# Patient Record
Sex: MALE | Race: WHITE | Employment: OTHER | URBAN - METROPOLITAN AREA
[De-identification: names, ages, dates, MRNs, and addresses within clinical notes are randomized per-mention and may not be internally consistent; named-entity substitution may affect disease eponyms.]

---

## 2017-01-17 ENCOUNTER — TELEPHONE (OUTPATIENT)
Dept: INTERNAL MEDICINE CLINIC | Facility: CLINIC | Age: 57
End: 2017-01-17

## 2017-01-18 RX ORDER — HYDROCODONE BITARTRATE AND ACETAMINOPHEN 10; 325 MG/1; MG/1
2 TABLET ORAL 3 TIMES DAILY PRN
Qty: 180 TABLET | Refills: 0 | Status: SHIPPED | OUTPATIENT
Start: 2017-01-18 | End: 2017-02-17

## 2017-01-18 RX ORDER — OXYCODONE HCL 40 MG/1
40 TABLET, FILM COATED, EXTENDED RELEASE ORAL 2 TIMES DAILY
Qty: 60 TABLET | Refills: 0 | Status: SHIPPED | OUTPATIENT
Start: 2017-01-18 | End: 2017-02-17

## 2017-01-18 NOTE — TELEPHONE ENCOUNTER
Imp- osteoarthritis; refilled oxycontin ER 40 mg po BID, #60 and Norco 10 mg two tabs po TID, #180; Rx mailed to pt; also, he is due for F/U in Feb or March; please call pt

## 2017-01-18 NOTE — TELEPHONE ENCOUNTER
Patient was contacted and notified that Rx's mailed to him per Jody Jeffery and that he is due for F/U in Feb or March. Patient verbalized understanding and will call back to schedule appointment.

## 2017-02-17 ENCOUNTER — TELEPHONE (OUTPATIENT)
Dept: INTERNAL MEDICINE CLINIC | Facility: CLINIC | Age: 57
End: 2017-02-17

## 2017-02-17 RX ORDER — HYDROCODONE BITARTRATE AND ACETAMINOPHEN 10; 325 MG/1; MG/1
2 TABLET ORAL 3 TIMES DAILY PRN
Qty: 180 TABLET | Refills: 0 | Status: SHIPPED | OUTPATIENT
Start: 2017-02-17 | End: 2017-03-20

## 2017-02-17 RX ORDER — OXYCODONE HCL 40 MG/1
40 TABLET, FILM COATED, EXTENDED RELEASE ORAL 2 TIMES DAILY
Qty: 60 TABLET | Refills: 0 | Status: SHIPPED | OUTPATIENT
Start: 2017-02-17 | End: 2017-03-20

## 2017-02-17 NOTE — TELEPHONE ENCOUNTER
[phone]  Pt requesting Rx refill of OxyContin and Angora to be mailed to his [address] address  To Rx

## 2017-02-17 NOTE — TELEPHONE ENCOUNTER
Patient called back. Relayed Dr Reggie Padilla message to him. Patient verbalized understanding. He will call back at later time to schedule follow up appointment.

## 2017-02-28 ENCOUNTER — PATIENT OUTREACH (OUTPATIENT)
Dept: INTERNAL MEDICINE CLINIC | Facility: CLINIC | Age: 57
End: 2017-02-28

## 2017-02-28 NOTE — PROGRESS NOTES
Fax received from Jacky Ag in [address] [place] [address], that patient was admitted on 2/24/17 and discharged on 2/27/17. All fax indicates is that patient was discharged home and will follow-up with PCP.  I contacted patient who states that he was i

## 2017-02-28 NOTE — PROGRESS NOTES
Initial Post Discharge Follow Up   Discharge Date:  2/27/2017  Contact Date: 2/28/2017    Consent Verification:  Assessment Completed With: Patient  HIPAA Verified? Yes    1.  Tell me why you were in the hospital?  Patient states that he was hospitalized b BID ) Disp: 30 g Rfl: 3   Clopidogrel Bisulfate (PLAVIX) 75 MG Oral Tab Take 1 tablet (75 mg total) by mouth daily.  Disp: 90 tablet Rfl: 3   Budesonide-Formoterol Fumarate (SYMBICORT) 160-4.5 MCG/ACT Inhalation Aerosol Inhale 2 puffs into the lungs 2 (two) orders reviewed.

## 2017-03-03 ENCOUNTER — LAB ENCOUNTER (OUTPATIENT)
Dept: LAB | Age: 57
End: 2017-03-03
Attending: INTERNAL MEDICINE
Payer: MEDICARE

## 2017-03-03 ENCOUNTER — OFFICE VISIT (OUTPATIENT)
Dept: INTERNAL MEDICINE CLINIC | Facility: CLINIC | Age: 57
End: 2017-03-03

## 2017-03-03 VITALS
RESPIRATION RATE: 16 BRPM | SYSTOLIC BLOOD PRESSURE: 130 MMHG | BODY MASS INDEX: 32.64 KG/M2 | TEMPERATURE: 98 F | HEIGHT: 69 IN | HEART RATE: 104 BPM | WEIGHT: 220.38 LBS | DIASTOLIC BLOOD PRESSURE: 72 MMHG

## 2017-03-03 DIAGNOSIS — I73.9 PVD (PERIPHERAL VASCULAR DISEASE) (HCC): ICD-10-CM

## 2017-03-03 DIAGNOSIS — I71.4 ABDOMINAL AORTIC ANEURYSM (AAA) WITHOUT RUPTURE (HCC): ICD-10-CM

## 2017-03-03 DIAGNOSIS — I10 ESSENTIAL HYPERTENSION: ICD-10-CM

## 2017-03-03 DIAGNOSIS — J44.1 COPD EXACERBATION (HCC): Primary | ICD-10-CM

## 2017-03-03 DIAGNOSIS — M48.061 LUMBAR SPINAL STENOSIS: ICD-10-CM

## 2017-03-03 DIAGNOSIS — E78.00 PURE HYPERCHOLESTEROLEMIA: ICD-10-CM

## 2017-03-03 DIAGNOSIS — M19.90 OSTEOARTHRITIS, UNSPECIFIED OSTEOARTHRITIS TYPE, UNSPECIFIED SITE: ICD-10-CM

## 2017-03-03 LAB
ANION GAP SERPL CALC-SCNC: 7 MMOL/L (ref 0–18)
BASOPHILS # BLD: 0.1 K/UL (ref 0–0.2)
BASOPHILS NFR BLD: 1 %
BUN SERPL-MCNC: 20 MG/DL (ref 8–20)
BUN/CREAT SERPL: 18.5 (ref 10–20)
CALCIUM SERPL-MCNC: 8.7 MG/DL (ref 8.5–10.5)
CHLORIDE SERPL-SCNC: 105 MMOL/L (ref 95–110)
CO2 SERPL-SCNC: 28 MMOL/L (ref 22–32)
CREAT SERPL-MCNC: 1.08 MG/DL (ref 0.5–1.5)
EOSINOPHIL # BLD: 0.5 K/UL (ref 0–0.7)
EOSINOPHIL NFR BLD: 4 %
ERYTHROCYTE [DISTWIDTH] IN BLOOD BY AUTOMATED COUNT: 14.3 % (ref 11–15)
GLUCOSE SERPL-MCNC: 106 MG/DL (ref 70–99)
HCT VFR BLD AUTO: 43.7 % (ref 41–52)
HGB BLD-MCNC: 14.6 G/DL (ref 13.5–17.5)
LYMPHOCYTES # BLD: 3.3 K/UL (ref 1–4)
LYMPHOCYTES NFR BLD: 24 %
MCH RBC QN AUTO: 29.9 PG (ref 27–32)
MCHC RBC AUTO-ENTMCNC: 33.5 G/DL (ref 32–37)
MCV RBC AUTO: 89.2 FL (ref 80–100)
MONOCYTES # BLD: 1.2 K/UL (ref 0–1)
MONOCYTES NFR BLD: 9 %
NEUTROPHILS # BLD AUTO: 9.1 K/UL (ref 1.8–7.7)
NEUTROPHILS NFR BLD: 64 %
OSMOLALITY UR CALC.SUM OF ELEC: 293 MOSM/KG (ref 275–295)
PLATELET # BLD AUTO: 223 K/UL (ref 140–400)
PMV BLD AUTO: 10.6 FL (ref 7.4–10.3)
POTASSIUM SERPL-SCNC: 3.7 MMOL/L (ref 3.3–5.1)
RBC # BLD AUTO: 4.9 M/UL (ref 4.5–5.9)
SODIUM SERPL-SCNC: 140 MMOL/L (ref 136–144)
WBC # BLD AUTO: 14.2 K/UL (ref 4–11)

## 2017-03-03 PROCEDURE — 36415 COLL VENOUS BLD VENIPUNCTURE: CPT

## 2017-03-03 PROCEDURE — 99496 TRANSJ CARE MGMT HIGH F2F 7D: CPT | Performed by: INTERNAL MEDICINE

## 2017-03-03 PROCEDURE — 85025 COMPLETE CBC W/AUTO DIFF WBC: CPT

## 2017-03-03 PROCEDURE — 80048 BASIC METABOLIC PNL TOTAL CA: CPT

## 2017-03-03 RX ORDER — PAROXETINE 10 MG/1
10 TABLET, FILM COATED ORAL EVERY MORNING
Qty: 30 TABLET | Refills: 5 | Status: SHIPPED | OUTPATIENT
Start: 2017-03-03 | End: 2019-06-06

## 2017-03-03 RX ORDER — TORSEMIDE 10 MG/1
10 TABLET ORAL DAILY
Qty: 30 TABLET | Refills: 5 | Status: SHIPPED | OUTPATIENT
Start: 2017-03-03 | End: 2021-09-08

## 2017-03-03 NOTE — PROGRESS NOTES
HPI:    Braxton Estrada is a 60year old male here today for hospital follow up.    He was discharged from Inpatient hospital, [facility] in [address], [place] to Home   Admission Date:  2/23/17  Discharge Date:  2/27/17  NEDA CAMPOS Discharge Soraida He has been advised for MRI lumbar spine to exclude spinal stenosis. He states he needs conscious sedation with propofol for MRI imaging. He has failed sedation with diazepam in the past.    He was treated Paxil 10 mg po qD for generalized anxiety.     MRI complete; angioplasty (coronary) (); appendectomy; other surgical history (2014); and other surgical history (10/2014).     He family history includes Breast Cancer (age of onset: 48) in his sister; Cancer in his brother; Heart Disease in an other fa tenderness  MUSCULOSKELETAL: back is not tender, FROM of the extremities  EXTREMITIES: no cyanosis, clubbing or edema  NEURO: Oriented times three, cranial nerves are intact, motor and sensory are grossly intact    ASSESSMENT/ PLAN:   Diagnoses and all ord psoriatic arthritis is raised; XR of hands done elsewhere (report not available); no longer sees rheumatologist in [facility], [address] at [facility] as he did not want to take immunosuppressant medications  AAA   Pt s/p AAA endograft in Oct 2014 at [facility]; [E]  Basic Metabolic Panel (8) [E]    Meds & Refills for this Visit:  Signed Prescriptions Disp Refills    torsemide 10 MG Oral Tab 30 tablet 5      Sig: Take 1 tablet (10 mg total) by mouth daily.       PARoxetine HCl 10 MG Oral Tab 30 tablet 5      Sig: T

## 2017-03-23 RX ORDER — OXYCODONE HCL 40 MG/1
40 TABLET, FILM COATED, EXTENDED RELEASE ORAL 2 TIMES DAILY
Qty: 60 TABLET | Refills: 0 | Status: SHIPPED | OUTPATIENT
Start: 2017-03-23 | End: 2017-04-24

## 2017-03-23 RX ORDER — HYDROCODONE BITARTRATE AND ACETAMINOPHEN 10; 325 MG/1; MG/1
2 TABLET ORAL 3 TIMES DAILY PRN
Qty: 180 TABLET | Refills: 0 | Status: SHIPPED | OUTPATIENT
Start: 2017-03-23 | End: 2017-04-24

## 2017-03-23 NOTE — TELEPHONE ENCOUNTER
Imp- osteoarthritis; refilled oxycontin ER 40 mg po BID, #60 and Norco 10 mg two tabs po TID, #180; Rx mailed to pt

## 2017-04-19 NOTE — TELEPHONE ENCOUNTER
Pt calling     Asking for refills for oxycontin ER 40 mg po BID, #60 and Norco 10 mg two tabs po TID    Pt would like these mailed to his home

## 2017-04-24 RX ORDER — HYDROCODONE BITARTRATE AND ACETAMINOPHEN 10; 325 MG/1; MG/1
2 TABLET ORAL 3 TIMES DAILY PRN
Qty: 180 TABLET | Refills: 0 | Status: SHIPPED | OUTPATIENT
Start: 2017-04-24 | End: 2017-05-15

## 2017-04-24 RX ORDER — OXYCODONE HCL 40 MG/1
40 TABLET, FILM COATED, EXTENDED RELEASE ORAL 2 TIMES DAILY
Qty: 60 TABLET | Refills: 0 | Status: SHIPPED | OUTPATIENT
Start: 2017-04-24 | End: 2017-05-15

## 2017-04-24 NOTE — TELEPHONE ENCOUNTER
To MD:  The above refill request is for a controlled substance. Please indicate yes or no to refill 30 days supply plus one refill. If more refills are appropriate, please indicate quantity  Pending - to DR. MEDINA

## 2017-04-25 NOTE — TELEPHONE ENCOUNTER
Imp- osteoarthritis; refilled oxycontin ER 40 mg po BID, #60 and Norco 10 mg two tabs po TID, #180; Rx mailed to pt; HANNAH

## 2017-05-15 RX ORDER — HYDROCODONE BITARTRATE AND ACETAMINOPHEN 10; 325 MG/1; MG/1
2 TABLET ORAL 3 TIMES DAILY PRN
Qty: 180 TABLET | Refills: 0 | Status: CANCELLED | OUTPATIENT
Start: 2017-05-15

## 2017-05-15 RX ORDER — HYDROCODONE BITARTRATE AND ACETAMINOPHEN 10; 325 MG/1; MG/1
2 TABLET ORAL 3 TIMES DAILY PRN
Qty: 180 TABLET | Refills: 0 | Status: SHIPPED | OUTPATIENT
Start: 2017-05-15 | End: 2017-06-19

## 2017-05-15 RX ORDER — OXYCODONE HCL 40 MG/1
40 TABLET, FILM COATED, EXTENDED RELEASE ORAL 2 TIMES DAILY
Qty: 60 TABLET | Refills: 0 | Status: SHIPPED | OUTPATIENT
Start: 2017-05-15 | End: 2017-06-19

## 2017-05-15 NOTE — TELEPHONE ENCOUNTER
Pt. Is requesting a refill on: Norco,   Oxycodone   Pt.  Would like Rx sent to home  (address verified) ph. # [phone]   Routed to rx

## 2017-05-15 NOTE — TELEPHONE ENCOUNTER
To MD:  The above refill request is for a controlled substance. Please indicate yes or no to refill 30 days supply plus one refill. If more refills are appropriate, please indicate quantity  Pending - to DR. MEDINA   Patient wants RX mailed to home

## 2017-06-16 ENCOUNTER — TELEPHONE (OUTPATIENT)
Dept: INTERNAL MEDICINE CLINIC | Facility: CLINIC | Age: 57
End: 2017-06-16

## 2017-06-19 ENCOUNTER — TELEPHONE (OUTPATIENT)
Dept: INTERNAL MEDICINE CLINIC | Facility: CLINIC | Age: 57
End: 2017-06-19

## 2017-06-19 NOTE — TELEPHONE ENCOUNTER
Pt. Is requesting a refill on: [place] & Oxycontin    Pt.  Would like Rx mailed (address verified)  Ph. # [phone]   Routed to Rx   msg 1 of 2

## 2017-06-19 NOTE — TELEPHONE ENCOUNTER
Pt. Is calling to inform Dr. Dinesh Domínguez that he had emergency surgery on both of his legs Friday, they had to put veins in both legs  Ph. # [phone]    Routed to clinical   msg 2 of 2

## 2017-06-21 RX ORDER — HYDROCODONE BITARTRATE AND ACETAMINOPHEN 10; 325 MG/1; MG/1
2 TABLET ORAL 3 TIMES DAILY PRN
Qty: 180 TABLET | Refills: 0 | Status: SHIPPED | OUTPATIENT
Start: 2017-06-21 | End: 2017-07-20

## 2017-06-21 RX ORDER — OXYCODONE HCL 40 MG/1
40 TABLET, FILM COATED, EXTENDED RELEASE ORAL 2 TIMES DAILY
Qty: 60 TABLET | Refills: 0 | Status: SHIPPED | OUTPATIENT
Start: 2017-06-21 | End: 2017-07-20

## 2017-07-20 ENCOUNTER — TELEPHONE (OUTPATIENT)
Dept: INTERNAL MEDICINE CLINIC | Facility: CLINIC | Age: 57
End: 2017-07-20

## 2017-07-20 RX ORDER — OXYCODONE HCL 40 MG/1
40 TABLET, FILM COATED, EXTENDED RELEASE ORAL 2 TIMES DAILY
Qty: 60 TABLET | Refills: 0 | Status: SHIPPED | OUTPATIENT
Start: 2017-07-20 | End: 2017-08-17

## 2017-07-20 RX ORDER — HYDROCODONE BITARTRATE AND ACETAMINOPHEN 10; 325 MG/1; MG/1
2 TABLET ORAL 3 TIMES DAILY PRN
Qty: 180 TABLET | Refills: 0 | Status: SHIPPED | OUTPATIENT
Start: 2017-07-20 | End: 2017-08-17

## 2017-08-17 ENCOUNTER — TELEPHONE (OUTPATIENT)
Dept: INTERNAL MEDICINE CLINIC | Facility: CLINIC | Age: 57
End: 2017-08-17

## 2017-08-17 RX ORDER — OXYCODONE HCL 40 MG/1
40 TABLET, FILM COATED, EXTENDED RELEASE ORAL 2 TIMES DAILY
Qty: 60 TABLET | Refills: 0 | Status: SHIPPED | OUTPATIENT
Start: 2017-08-17 | End: 2017-09-13

## 2017-08-17 RX ORDER — HYDROCODONE BITARTRATE AND ACETAMINOPHEN 10; 325 MG/1; MG/1
2 TABLET ORAL 3 TIMES DAILY PRN
Qty: 180 TABLET | Refills: 0 | Status: SHIPPED | OUTPATIENT
Start: 2017-08-17 | End: 2017-09-13

## 2017-08-17 NOTE — TELEPHONE ENCOUNTER
Pt requesting that refills for his medications be mailed to him  Norco  Oxycontin  Pt will run out of medication on 8/24/17  Pt will schedule appt in October  Tasked to [employer]

## 2017-09-13 ENCOUNTER — TELEPHONE (OUTPATIENT)
Dept: INTERNAL MEDICINE CLINIC | Facility: CLINIC | Age: 57
End: 2017-09-13

## 2017-09-13 NOTE — TELEPHONE ENCOUNTER
To MD:  The above refill request is for a controlled substance. Please indicate yes or no to refill 30 days supply plus one refill. If more refills are appropriate, please indicate quantity  To DR. MEDINA - patient wants RX mailed to his PO Box Patient/Caregiver provided printed discharge information.

## 2017-09-14 RX ORDER — OXYCODONE HCL 40 MG/1
40 TABLET, FILM COATED, EXTENDED RELEASE ORAL 2 TIMES DAILY
Qty: 60 TABLET | Refills: 0 | Status: SHIPPED | OUTPATIENT
Start: 2017-09-14 | End: 2017-10-06

## 2017-09-14 RX ORDER — HYDROCODONE BITARTRATE AND ACETAMINOPHEN 10; 325 MG/1; MG/1
2 TABLET ORAL 3 TIMES DAILY PRN
Qty: 180 TABLET | Refills: 0 | Status: SHIPPED | OUTPATIENT
Start: 2017-09-14 | End: 2017-10-06

## 2017-09-14 NOTE — TELEPHONE ENCOUNTER
Dr. Jessica Choi message relayed to pt who verbalized understanding. Pt states he will call back Monday to make appt.

## 2017-09-14 NOTE — TELEPHONE ENCOUNTER
Imp- osteoarthritis; refilled oxycontin ER 40 mg po BID, #60 and Norco 10 mg two tabs po TID, #180; Rx mailed to pt; pt due for OV in next 1-2 months; please call pt

## 2017-09-22 NOTE — TELEPHONE ENCOUNTER
Pt. Is calling because his insurance company told him that he needs PA done. The insurance company is faxing over the form now, he would like us to complete it today so he can get his Rx filled.   Ph. # [phone]  Routed to Rx

## 2017-09-22 NOTE — TELEPHONE ENCOUNTER
[phone] [facility] faxing [place] for Oxycodone 40 mg.   Placed in scanning   Tasked to rx

## 2017-10-06 ENCOUNTER — OFFICE VISIT (OUTPATIENT)
Dept: INTERNAL MEDICINE CLINIC | Facility: CLINIC | Age: 57
End: 2017-10-06

## 2017-10-06 VITALS
RESPIRATION RATE: 20 BRPM | SYSTOLIC BLOOD PRESSURE: 128 MMHG | TEMPERATURE: 98 F | BODY MASS INDEX: 31.35 KG/M2 | HEART RATE: 115 BPM | WEIGHT: 219 LBS | HEIGHT: 70 IN | DIASTOLIC BLOOD PRESSURE: 70 MMHG | OXYGEN SATURATION: 96 %

## 2017-10-06 DIAGNOSIS — E78.00 PURE HYPERCHOLESTEROLEMIA: ICD-10-CM

## 2017-10-06 DIAGNOSIS — I71.4 ABDOMINAL AORTIC ANEURYSM (AAA) WITHOUT RUPTURE (HCC): ICD-10-CM

## 2017-10-06 DIAGNOSIS — F17.200 SMOKING: ICD-10-CM

## 2017-10-06 DIAGNOSIS — I73.9 PVD (PERIPHERAL VASCULAR DISEASE) (HCC): ICD-10-CM

## 2017-10-06 DIAGNOSIS — M19.90 OSTEOARTHRITIS, UNSPECIFIED OSTEOARTHRITIS TYPE, UNSPECIFIED SITE: ICD-10-CM

## 2017-10-06 DIAGNOSIS — S83.206S ACUTE MENISCAL TEAR OF RIGHT KNEE, SEQUELA: ICD-10-CM

## 2017-10-06 DIAGNOSIS — I10 ESSENTIAL HYPERTENSION: ICD-10-CM

## 2017-10-06 DIAGNOSIS — J43.9 PULMONARY EMPHYSEMA, UNSPECIFIED EMPHYSEMA TYPE (HCC): Primary | ICD-10-CM

## 2017-10-06 PROCEDURE — G0008 ADMIN INFLUENZA VIRUS VAC: HCPCS | Performed by: INTERNAL MEDICINE

## 2017-10-06 PROCEDURE — 90686 IIV4 VACC NO PRSV 0.5 ML IM: CPT | Performed by: INTERNAL MEDICINE

## 2017-10-06 PROCEDURE — 99214 OFFICE O/P EST MOD 30 MIN: CPT | Performed by: INTERNAL MEDICINE

## 2017-10-06 PROCEDURE — G0463 HOSPITAL OUTPT CLINIC VISIT: HCPCS | Performed by: INTERNAL MEDICINE

## 2017-10-06 RX ORDER — HYDROCODONE BITARTRATE AND ACETAMINOPHEN 10; 325 MG/1; MG/1
2 TABLET ORAL 3 TIMES DAILY PRN
Qty: 180 TABLET | Refills: 0 | Status: SHIPPED | OUTPATIENT
Start: 2017-11-02 | End: 2017-11-13

## 2017-11-13 RX ORDER — HYDROCODONE BITARTRATE AND ACETAMINOPHEN 10; 325 MG/1; MG/1
2 TABLET ORAL 3 TIMES DAILY PRN
Qty: 180 TABLET | Refills: 0 | Status: SHIPPED | OUTPATIENT
Start: 2017-11-13 | End: 2017-12-11

## 2017-11-13 NOTE — TELEPHONE ENCOUNTER
To MD:  The above refill request is for a controlled substance. Please indicate yes or no to refill 30 days supply plus one refill. If more refills are appropriate, please indicate quantity  To DR. Jef Fields was just printed on 11/2/2017 # 180

## 2017-12-11 RX ORDER — HYDROCODONE BITARTRATE AND ACETAMINOPHEN 10; 325 MG/1; MG/1
2 TABLET ORAL 3 TIMES DAILY PRN
Qty: 180 TABLET | Refills: 0 | Status: SHIPPED | OUTPATIENT
Start: 2017-12-11 | End: 2018-01-08

## 2017-12-11 NOTE — TELEPHONE ENCOUNTER
Patient called to request refills on [address] if prescriptions  can be mailed out sooner due to Cesia Holiday    Can be reached at [phone]  If there are any questions

## 2017-12-11 NOTE — TELEPHONE ENCOUNTER
To MD:  The above refill request is for a controlled substance. Please indicate yes or no to refill 30 days supply plus one refill. If more refills are appropriate, please indicate quantity  To DR. MEDINA

## 2018-01-08 ENCOUNTER — TELEPHONE (OUTPATIENT)
Dept: INTERNAL MEDICINE CLINIC | Facility: CLINIC | Age: 58
End: 2018-01-08

## 2018-01-08 RX ORDER — HYDROCODONE BITARTRATE AND ACETAMINOPHEN 10; 325 MG/1; MG/1
2 TABLET ORAL 3 TIMES DAILY PRN
Qty: 180 TABLET | Refills: 0 | Status: SHIPPED | OUTPATIENT
Start: 2018-01-08 | End: 2018-02-15

## 2018-02-15 RX ORDER — HYDROCODONE BITARTRATE AND ACETAMINOPHEN 10; 325 MG/1; MG/1
2 TABLET ORAL 3 TIMES DAILY PRN
Qty: 180 TABLET | Refills: 0 | Status: SHIPPED | OUTPATIENT
Start: 2018-02-15 | End: 2018-03-13

## 2018-02-15 NOTE — TELEPHONE ENCOUNTER
Pt requesting refill Oxycontin 40 mg, Norco 10 - 325 mg, please mail to home address verified    Tasked to rx

## 2018-03-13 NOTE — TELEPHONE ENCOUNTER
Patient needs refill for:    Norco 10/325mg    Oxycontin 40mg      Mail to patient in [facility], [place]

## 2018-03-13 NOTE — TELEPHONE ENCOUNTER
To Laurel Mason MD:  The above refill request is for a controlled substance. Please indicate yes or no to refill 30 days supply plus one refill.   If more refills are appropriate, please indicate quantity

## 2018-03-15 RX ORDER — HYDROCODONE BITARTRATE AND ACETAMINOPHEN 10; 325 MG/1; MG/1
2 TABLET ORAL 3 TIMES DAILY PRN
Qty: 180 TABLET | Refills: 0 | Status: SHIPPED | OUTPATIENT
Start: 2018-03-15 | End: 2018-04-11

## 2018-03-15 NOTE — TELEPHONE ENCOUNTER
Please call pt with status, needs RX mailed to him  Has one week of medication left, lives in [place]  Tasked to Dr Stephen Norris

## 2018-04-13 RX ORDER — HYDROCODONE BITARTRATE AND ACETAMINOPHEN 10; 325 MG/1; MG/1
2 TABLET ORAL 3 TIMES DAILY PRN
Qty: 180 TABLET | Refills: 0 | Status: SHIPPED | OUTPATIENT
Start: 2018-04-13 | End: 2018-05-10

## 2018-05-10 RX ORDER — HYDROCODONE BITARTRATE AND ACETAMINOPHEN 10; 325 MG/1; MG/1
2 TABLET ORAL 3 TIMES DAILY PRN
Qty: 180 TABLET | Refills: 0 | Status: SHIPPED | OUTPATIENT
Start: 2018-05-10 | End: 2018-06-08

## 2018-05-10 NOTE — TELEPHONE ENCOUNTER
Pt called to request prescriptions for Norco & Oxycontin  Please mail to patient    (pt scheduled physical with Dr Abhijeet York on June 8)

## 2018-05-10 NOTE — TELEPHONE ENCOUNTER
Imp- osteoarthritis; refilled oxycontin ER 40 mg po BID, #60 and Norco 10 mg two tabs po TID, #180; Rx mailed to pt; next OV 6/8/18; will plan routine drug panel-12 at next OV; will also need to discuss pt finding MD in [place] for pain management as I am

## 2018-06-08 ENCOUNTER — OFFICE VISIT (OUTPATIENT)
Dept: INTERNAL MEDICINE CLINIC | Facility: CLINIC | Age: 58
End: 2018-06-08

## 2018-06-08 ENCOUNTER — LAB ENCOUNTER (OUTPATIENT)
Dept: LAB | Age: 58
End: 2018-06-08
Attending: INTERNAL MEDICINE
Payer: MEDICARE

## 2018-06-08 VITALS
HEIGHT: 70 IN | BODY MASS INDEX: 30.06 KG/M2 | WEIGHT: 210 LBS | TEMPERATURE: 98 F | DIASTOLIC BLOOD PRESSURE: 66 MMHG | HEART RATE: 76 BPM | SYSTOLIC BLOOD PRESSURE: 134 MMHG

## 2018-06-08 DIAGNOSIS — F17.200 SMOKING: ICD-10-CM

## 2018-06-08 DIAGNOSIS — S83.206S ACUTE MENISCAL TEAR OF RIGHT KNEE, SEQUELA: ICD-10-CM

## 2018-06-08 DIAGNOSIS — M19.90 OSTEOARTHRITIS, UNSPECIFIED OSTEOARTHRITIS TYPE, UNSPECIFIED SITE: ICD-10-CM

## 2018-06-08 DIAGNOSIS — S86.011S ACHILLES RUPTURE, RIGHT, SEQUELA: ICD-10-CM

## 2018-06-08 DIAGNOSIS — I71.4 ABDOMINAL AORTIC ANEURYSM (AAA) WITHOUT RUPTURE (HCC): ICD-10-CM

## 2018-06-08 DIAGNOSIS — I73.9 PVD (PERIPHERAL VASCULAR DISEASE) (HCC): ICD-10-CM

## 2018-06-08 DIAGNOSIS — E78.00 PURE HYPERCHOLESTEROLEMIA: ICD-10-CM

## 2018-06-08 DIAGNOSIS — J44.9 CHRONIC OBSTRUCTIVE PULMONARY DISEASE, UNSPECIFIED COPD TYPE (HCC): ICD-10-CM

## 2018-06-08 DIAGNOSIS — Z12.5 SCREENING PSA (PROSTATE SPECIFIC ANTIGEN): ICD-10-CM

## 2018-06-08 DIAGNOSIS — I10 ESSENTIAL HYPERTENSION: ICD-10-CM

## 2018-06-08 DIAGNOSIS — Z00.00 PHYSICAL EXAM, ANNUAL: Primary | ICD-10-CM

## 2018-06-08 PROCEDURE — 36415 COLL VENOUS BLD VENIPUNCTURE: CPT

## 2018-06-08 PROCEDURE — 80053 COMPREHEN METABOLIC PANEL: CPT

## 2018-06-08 PROCEDURE — 80061 LIPID PANEL: CPT

## 2018-06-08 PROCEDURE — 85025 COMPLETE CBC W/AUTO DIFF WBC: CPT

## 2018-06-08 PROCEDURE — 99396 PREV VISIT EST AGE 40-64: CPT | Performed by: INTERNAL MEDICINE

## 2018-06-08 PROCEDURE — 84443 ASSAY THYROID STIM HORMONE: CPT

## 2018-06-08 RX ORDER — HYDROCODONE BITARTRATE AND ACETAMINOPHEN 10; 325 MG/1; MG/1
2 TABLET ORAL 3 TIMES DAILY PRN
Qty: 180 TABLET | Refills: 0 | Status: SHIPPED | OUTPATIENT
Start: 2018-06-09 | End: 2018-06-27

## 2018-06-08 NOTE — PROGRESS NOTES
Angelina Kumar is a 59year old male. HPI:   Patient presents with:  Checkup: Pt had two surgeries for a tear achilles tendon. He also wants to consult with Dr. Raya Velasquez about vascular surgery recommended.        60 y/o M with PVD, OA here for F/U; h History   Problem Relation Age of Onset   • Cancer Brother    • Breast Cancer Sister 48   • Stroke Other    • Heart Disease Other    • Hypertension Other       Social History: Smoking status: Current Every Day Smoker Comment:ADHESIVE  Chlorine                RASH  Iodides  [Radiology*        Comment:Other reaction(s): passes out  [place]               PAIN    Comment:UNKNOWN  Lactose                 UNKNOWN  Latex                   UNKNOWN  Lipitor [Atorvastat*    MY upper and lower extremities  Neurological: cranial nerves II-XII intact; light touch and proprioception intact  Skin: no rash or ulcerations         ASSESSMENT/PLAN:   Physical exam       COPD  with asthmatic features  on albuterol HFA 2 p QID, and S[employer] duplex scan shows the right iliac artery widely patent across femoral bypass to be widely patent in the right fem-pop bypass to be patent; now sees Dr Myra Rubio (Dr Swanson Heads retired)    Hypertension  on lisinopril 20 mg po qD; pt states he had cardiac st total) by mouth Q12H. HYDROcodone-acetaminophen (NORCO)  MG Oral Tab 180 tablet 0      Sig: Take 2 tablets by mouth 3 (three) times daily as needed.            Imaging & Referrals:  None     6/8/2018  Laura Dawkins MD

## 2018-06-26 ENCOUNTER — TELEPHONE (OUTPATIENT)
Dept: INTERNAL MEDICINE CLINIC | Facility: CLINIC | Age: 58
End: 2018-06-26

## 2018-06-27 RX ORDER — HYDROCODONE BITARTRATE AND ACETAMINOPHEN 10; 325 MG/1; MG/1
2 TABLET ORAL 3 TIMES DAILY PRN
Qty: 180 TABLET | Refills: 0 | Status: SHIPPED | OUTPATIENT
Start: 2018-07-09 | End: 2018-07-27

## 2018-06-28 NOTE — TELEPHONE ENCOUNTER
Imp- osteoarthritis; refilled oxycontin ER 40 mg po BID, #60 and Norco 10 mg two tabs po TID, #180; Rx mailed to pt;

## 2018-07-27 NOTE — TELEPHONE ENCOUNTER
Pt called, requesting refill for:  Norco  Oxycontin  Pt has these mailed to his home  Pt needs by Aug 9th.    Tasked to [employer]

## 2018-07-27 NOTE — TELEPHONE ENCOUNTER
To MD:  The above refill request is for a controlled substance. Please indicate yes or no to refill 30 days supply plus one refill. If more refills are appropriate, please indicate quantity  Please advise for DR. MEDINA patient thanks - to DR. GERMAIN

## 2018-07-31 NOTE — TELEPHONE ENCOUNTER
Looks like irregular usage with the [place], Dr. Augustina Quezada will be back next week in plenty of time to refill this, task to him-damico

## 2018-08-06 RX ORDER — HYDROCODONE BITARTRATE AND ACETAMINOPHEN 10; 325 MG/1; MG/1
2 TABLET ORAL 3 TIMES DAILY PRN
Qty: 180 TABLET | Refills: 0 | Status: SHIPPED | OUTPATIENT
Start: 2018-08-06 | End: 2018-08-27

## 2018-08-27 RX ORDER — HYDROCODONE BITARTRATE AND ACETAMINOPHEN 10; 325 MG/1; MG/1
2 TABLET ORAL 3 TIMES DAILY PRN
Qty: 180 TABLET | Refills: 0 | Status: SHIPPED | OUTPATIENT
Start: 2018-08-27 | End: 2018-09-24

## 2018-09-24 ENCOUNTER — TELEPHONE (OUTPATIENT)
Dept: INTERNAL MEDICINE CLINIC | Facility: CLINIC | Age: 58
End: 2018-09-24

## 2018-09-24 RX ORDER — HYDROCODONE BITARTRATE AND ACETAMINOPHEN 10; 325 MG/1; MG/1
2 TABLET ORAL 3 TIMES DAILY PRN
Qty: 180 TABLET | Refills: 0 | Status: SHIPPED | OUTPATIENT
Start: 2018-09-24 | End: 2018-10-22

## 2018-09-24 NOTE — TELEPHONE ENCOUNTER
Called patient and  Relayed DR. MEDINA message - left on VM .  Both RX ready for  at [address]

## 2018-09-24 NOTE — TELEPHONE ENCOUNTER
Imp- osteoarthritis; refilled oxycontin ER 40 mg po BID, #60 and Norco 10 mg two tabs po TID, #180; Rx left at window asa requested; please call pt

## 2018-09-28 ENCOUNTER — OFFICE VISIT (OUTPATIENT)
Dept: INTERNAL MEDICINE CLINIC | Facility: CLINIC | Age: 58
End: 2018-09-28
Payer: MEDICARE

## 2018-09-28 VITALS
OXYGEN SATURATION: 98 % | TEMPERATURE: 98 F | BODY MASS INDEX: 29.63 KG/M2 | HEART RATE: 87 BPM | DIASTOLIC BLOOD PRESSURE: 84 MMHG | HEIGHT: 70 IN | WEIGHT: 207 LBS | SYSTOLIC BLOOD PRESSURE: 136 MMHG

## 2018-09-28 DIAGNOSIS — M19.90 OSTEOARTHRITIS, UNSPECIFIED OSTEOARTHRITIS TYPE, UNSPECIFIED SITE: ICD-10-CM

## 2018-09-28 DIAGNOSIS — I73.9 PVD (PERIPHERAL VASCULAR DISEASE) (HCC): ICD-10-CM

## 2018-09-28 DIAGNOSIS — J44.9 CHRONIC OBSTRUCTIVE PULMONARY DISEASE, UNSPECIFIED COPD TYPE (HCC): ICD-10-CM

## 2018-09-28 DIAGNOSIS — I71.4 ABDOMINAL AORTIC ANEURYSM (AAA) WITHOUT RUPTURE (HCC): ICD-10-CM

## 2018-09-28 DIAGNOSIS — I10 ESSENTIAL HYPERTENSION: ICD-10-CM

## 2018-09-28 DIAGNOSIS — L03.115 CELLULITIS OF RIGHT LEG: Primary | ICD-10-CM

## 2018-09-28 DIAGNOSIS — F17.200 SMOKING: ICD-10-CM

## 2018-09-28 PROCEDURE — 99214 OFFICE O/P EST MOD 30 MIN: CPT | Performed by: INTERNAL MEDICINE

## 2018-09-28 PROCEDURE — G0463 HOSPITAL OUTPT CLINIC VISIT: HCPCS | Performed by: INTERNAL MEDICINE

## 2018-09-28 RX ORDER — CEPHALEXIN 500 MG/1
500 CAPSULE ORAL 4 TIMES DAILY
Qty: 28 CAPSULE | Refills: 0 | Status: SHIPPED | OUTPATIENT
Start: 2018-09-28 | End: 2019-06-06 | Stop reason: ALTCHOICE

## 2018-09-28 NOTE — PROGRESS NOTES
Sherren Rocher is a 59year old male.     HPI:   Patient presents with:  Checkup: Dr. Bo Horne wants to do surgery again and patient is not sure about it.       58 y/o M with PVD who reports +claudication to RLE x several months; claudication occurs when tobacco: Never Used    Alcohol use: No      Alcohol/week: 0.0 oz    Drug use: No       Medications (Active prior to today's visit):    Current Outpatient Medications:  cephALEXin 500 MG Oral Cap Take 1 capsule (500 mg total) by mouth 4 (four) times daily. UNKNOWN  Lipitor [Atorvastat*    MYALGIA    Comment:April 2016  Morphine                    Comment:Other reaction(s): GI upset  Penicillins             UNKNOWN  Pollen Extract          UNKNOWN              ROS:   Constitutional: no weigh MI in 2016; right knee brace ordered for support via [employer] [facility]; discussed weaning of narcotic Rx--> pt has failed trials of lower doses of narcotic pain regimen in the past; current regimen has improved analgesia superior to alternate regimens, impr colonoscopy as he does not wish to hold Plavix for procedure  Right shoulder labral tear  s/p arthroscopic repair by orthopedics Dr Dada Domingo at [facility] in [place] in Oct 12, 2015  Venous insufficiency  Takes torsemide 10 mg po qD  Prostatism  On Fl

## 2018-10-01 ENCOUNTER — TELEPHONE (OUTPATIENT)
Dept: INTERNAL MEDICINE CLINIC | Facility: CLINIC | Age: 58
End: 2018-10-01

## 2018-10-01 NOTE — TELEPHONE ENCOUNTER
Called and spoke to Lyndora at [facility] and gave ok per Dr Jossy Woods to fill norco and oxycontin.

## 2018-10-01 NOTE — TELEPHONE ENCOUNTER
Pt. Would like to speak with a nurse no additional info provided ph.  # [phone]   Routed to clinical

## 2018-10-01 NOTE — TELEPHONE ENCOUNTER
Pt left his Oxycontin and Hydrocodone at home in [place] and wants to fill early please advise also need a refill for Cephalexin 500mg caps

## 2018-10-01 NOTE — TELEPHONE ENCOUNTER
Spoke with patient. He reports he lives in [facility] but has been working in [employer]. He states he left a lot of his medication at home and so when he saw Dr Colton Ruff in the office on 9/28, he wrote him new scripts for Norco and oxycontin.   Per pa

## 2018-10-01 NOTE — TELEPHONE ENCOUNTER
Pharmacist would like okay for early release of both OxyContin and Phil Shell would be a 10 day early release and oxy would be 1 week early. To Dr. Jossy Woods to advise. Pt reports to pharmacy he left remaining pills in [place].

## 2018-10-22 ENCOUNTER — TELEPHONE (OUTPATIENT)
Dept: INTERNAL MEDICINE CLINIC | Facility: CLINIC | Age: 58
End: 2018-10-22

## 2018-10-22 RX ORDER — HYDROCODONE BITARTRATE AND ACETAMINOPHEN 10; 325 MG/1; MG/1
2 TABLET ORAL 3 TIMES DAILY PRN
Qty: 180 TABLET | Refills: 0 | Status: SHIPPED | OUTPATIENT
Start: 2018-10-22 | End: 2018-11-19

## 2018-10-22 NOTE — TELEPHONE ENCOUNTER
Pt requesting a refill request for Norco and Oxycontin like this mailed to him.  also like a 2 month prescriptions so he can give to the pharmacist

## 2018-10-22 NOTE — TELEPHONE ENCOUNTER
To MD:  The above refill request is for a controlled substance. Please indicate yes or no to refill 30 days supply plus one refill.   If more refills are appropriate, please indicate quantity    Last refilled       Norco = 9/24/2018 #180/0   Oxycontin - 9/

## 2018-10-23 NOTE — TELEPHONE ENCOUNTER
Imp- osteoarthritis; refilled oxycontin ER 40 mg po BID, #60 and Norco 10 mg two tabs po TID, #180; Rx mailed; cannot do more than one month at a time; please call pt

## 2018-11-19 ENCOUNTER — TELEPHONE (OUTPATIENT)
Dept: INTERNAL MEDICINE CLINIC | Facility: CLINIC | Age: 58
End: 2018-11-19

## 2018-11-19 RX ORDER — HYDROCODONE BITARTRATE AND ACETAMINOPHEN 10; 325 MG/1; MG/1
2 TABLET ORAL 3 TIMES DAILY PRN
Qty: 180 TABLET | Refills: 0 | Status: SHIPPED | OUTPATIENT
Start: 2018-11-19 | End: 2018-12-17

## 2018-11-19 NOTE — TELEPHONE ENCOUNTER
Pt. Would like his Rx for [place] & OxyContin mailed to his home (address verified) ph. # [phone]   Routed to Rx

## 2018-11-20 NOTE — TELEPHONE ENCOUNTER
Imp- osteoarthritis; refilled oxycontin ER 40 mg po BID, #60 and Norco 10 mg two tabs po TID, #180; Rx mailed

## 2018-12-17 ENCOUNTER — TELEPHONE (OUTPATIENT)
Dept: INTERNAL MEDICINE CLINIC | Facility: CLINIC | Age: 58
End: 2018-12-17

## 2018-12-17 RX ORDER — HYDROCODONE BITARTRATE AND ACETAMINOPHEN 10; 325 MG/1; MG/1
2 TABLET ORAL 3 TIMES DAILY PRN
Qty: 180 TABLET | Refills: 0 | Status: SHIPPED | OUTPATIENT
Start: 2018-12-17 | End: 2019-01-15

## 2018-12-17 NOTE — TELEPHONE ENCOUNTER
To MD:  The above refill request is for a controlled substance. Please review pended medication order. Print and sign for staff to fax to pharmacy. To DR. MEDINA

## 2018-12-17 NOTE — TELEPHONE ENCOUNTER
Pt requesting refills for:  Norco  Oxycontin  Pt requesting that these be mailed to him before the holiday  Tasked to Marisela Hinds

## 2019-01-15 NOTE — TELEPHONE ENCOUNTER
To MD:  The above refill request is for a controlled substance. Please review pended medication order. Print and sign for staff to fax to pharmacy.     [place]  -     Hydrocodone #180/0 10/1/2018    Oxycontin #60/0 10/2/2018

## 2019-01-16 RX ORDER — HYDROCODONE BITARTRATE AND ACETAMINOPHEN 10; 325 MG/1; MG/1
2 TABLET ORAL 3 TIMES DAILY PRN
Qty: 180 TABLET | Refills: 0 | Status: SHIPPED | OUTPATIENT
Start: 2019-01-16 | End: 2019-02-14

## 2019-02-14 RX ORDER — HYDROCODONE BITARTRATE AND ACETAMINOPHEN 10; 325 MG/1; MG/1
2 TABLET ORAL 3 TIMES DAILY PRN
Qty: 180 TABLET | Refills: 0 | Status: SHIPPED | OUTPATIENT
Start: 2019-02-14 | End: 2019-03-11

## 2019-02-14 NOTE — TELEPHONE ENCOUNTER
Pt is requesting that we mail him his prescription for Norco and Oxycontin. Best call back is [phone].

## 2019-02-14 NOTE — TELEPHONE ENCOUNTER
To MD:  The above refill request is for a controlled substance. Please review pended medication order. Print and sign for staff to fax to pharmacy.       [place]  -   [address] [facility] - 10/01/2018 #180/0   Oxycodone - 10/02/2018  #60/0

## 2019-02-14 NOTE — TELEPHONE ENCOUNTER
Imp- osteoarthritis; refilled oxycontin ER 40 mg po BID, #60 and Norco 10 mg two tabs po TID, #180; ICD 10 code for pt G89.4;  Rx mailed

## 2019-03-11 ENCOUNTER — TELEPHONE (OUTPATIENT)
Dept: INTERNAL MEDICINE CLINIC | Facility: CLINIC | Age: 59
End: 2019-03-11

## 2019-03-11 RX ORDER — HYDROCODONE BITARTRATE AND ACETAMINOPHEN 10; 325 MG/1; MG/1
2 TABLET ORAL 3 TIMES DAILY PRN
Qty: 180 TABLET | Refills: 0 | Status: SHIPPED | OUTPATIENT
Start: 2019-03-11 | End: 2019-04-04

## 2019-03-11 NOTE — TELEPHONE ENCOUNTER
Patient is calling for refills. Hydrocodone  Oxycontin    Ask that we mail to scripts to home address.     Patient [phone]

## 2019-03-12 NOTE — TELEPHONE ENCOUNTER
Imp- osteoarthritis; refilled oxycontin ER 40 mg po BID, #60 and Norco 10 mg two tabs po TID, #180; ICD 10 code for pt G89.4; next OV 4/22/19;  Rx mailed

## 2019-04-04 ENCOUNTER — TELEPHONE (OUTPATIENT)
Dept: INTERNAL MEDICINE CLINIC | Facility: CLINIC | Age: 59
End: 2019-04-04

## 2019-04-04 RX ORDER — HYDROCODONE BITARTRATE AND ACETAMINOPHEN 10; 325 MG/1; MG/1
2 TABLET ORAL 3 TIMES DAILY PRN
Qty: 180 TABLET | Refills: 0 | Status: SHIPPED | OUTPATIENT
Start: 2019-04-10 | End: 2019-05-03

## 2019-04-04 NOTE — TELEPHONE ENCOUNTER
Pt requesting that refills for his medications be mailed to him at his home address:  [place]  Oxycontin  Pt requesting early because of upcoming holiday  Tasked to [employer]

## 2019-04-04 NOTE — TELEPHONE ENCOUNTER
TO MD:   The pended medication is for a schedule CII medication;   Please review   Print and sign if appropriate and staff will contact the patient for . Patient wants it mailed - to DR. MEDINA

## 2019-05-03 ENCOUNTER — TELEPHONE (OUTPATIENT)
Dept: INTERNAL MEDICINE CLINIC | Facility: CLINIC | Age: 59
End: 2019-05-03

## 2019-05-03 RX ORDER — HYDROCODONE BITARTRATE AND ACETAMINOPHEN 10; 325 MG/1; MG/1
2 TABLET ORAL 3 TIMES DAILY PRN
Qty: 180 TABLET | Refills: 0 | Status: SHIPPED | OUTPATIENT
Start: 2019-05-03 | End: 2019-06-06

## 2019-05-03 NOTE — TELEPHONE ENCOUNTER
Refill request to Dr. Laurel Luke. GENERAL: elderly gentleman, pleasant in no acute distress.  HEAD:  Atraumatic, Normocephalic  EYES: EOMI, PERRLA,  NECK: Supple, No JVD  CHEST/LUNG: Clear to auscultation bilaterally; No wheeze  HEART: Regular rate and rhythm; No murmurs, rubs, or gallops  ABDOMEN: Soft, Nontender, +distension; Bowel sounds present  EXTREMITIES:  2+ Peripheral Pulses, No clubbing, cyanosis, or edema. Mild TTP of lateral buttocks. FROM of hip; normal extremity strength. No leg length discrepancy.   BACK; no point tenderness of spine, sacral base or paraspinal musculature.  PSYCH: AAOx3  NEUROLOGY: non-focal. Negative Straight leg raise b/l   SKIN: No rashes or lesions

## 2019-05-03 NOTE — TELEPHONE ENCOUNTER
Pt would like to have Dr Sadaf Becerra call him  He was told he needed surgery to have Mesh removed, pt wants to have it done in [facility]  Please call [phone]    Tasked to nursing

## 2019-06-06 ENCOUNTER — LAB ENCOUNTER (OUTPATIENT)
Dept: LAB | Age: 59
End: 2019-06-06
Attending: INTERNAL MEDICINE
Payer: MEDICARE

## 2019-06-06 ENCOUNTER — OFFICE VISIT (OUTPATIENT)
Dept: INTERNAL MEDICINE CLINIC | Facility: CLINIC | Age: 59
End: 2019-06-06
Payer: MEDICARE

## 2019-06-06 VITALS
OXYGEN SATURATION: 91 % | HEIGHT: 70 IN | WEIGHT: 214 LBS | BODY MASS INDEX: 30.64 KG/M2 | HEART RATE: 101 BPM | TEMPERATURE: 98 F | SYSTOLIC BLOOD PRESSURE: 148 MMHG | DIASTOLIC BLOOD PRESSURE: 88 MMHG

## 2019-06-06 DIAGNOSIS — I10 ESSENTIAL HYPERTENSION: ICD-10-CM

## 2019-06-06 DIAGNOSIS — M19.90 OSTEOARTHRITIS, UNSPECIFIED OSTEOARTHRITIS TYPE, UNSPECIFIED SITE: ICD-10-CM

## 2019-06-06 DIAGNOSIS — I87.2 VENOUS INSUFFICIENCY: ICD-10-CM

## 2019-06-06 DIAGNOSIS — E78.00 PURE HYPERCHOLESTEROLEMIA: ICD-10-CM

## 2019-06-06 DIAGNOSIS — K64.8 INTERNAL HEMORRHOIDS: ICD-10-CM

## 2019-06-06 DIAGNOSIS — S83.206S ACUTE MENISCAL TEAR OF RIGHT KNEE, SEQUELA: ICD-10-CM

## 2019-06-06 DIAGNOSIS — G89.4 CHRONIC PAIN SYNDROME: ICD-10-CM

## 2019-06-06 DIAGNOSIS — F17.200 SMOKING: ICD-10-CM

## 2019-06-06 DIAGNOSIS — G25.81 RLS (RESTLESS LEGS SYNDROME): ICD-10-CM

## 2019-06-06 DIAGNOSIS — K76.0 HEPATIC STEATOSIS: ICD-10-CM

## 2019-06-06 DIAGNOSIS — K29.70 GASTRITIS WITHOUT BLEEDING, UNSPECIFIED CHRONICITY, UNSPECIFIED GASTRITIS TYPE: ICD-10-CM

## 2019-06-06 DIAGNOSIS — R73.9 HYPERGLYCEMIA: ICD-10-CM

## 2019-06-06 DIAGNOSIS — I73.9 PVD (PERIPHERAL VASCULAR DISEASE) (HCC): ICD-10-CM

## 2019-06-06 DIAGNOSIS — R04.2 HEMOPTYSIS: ICD-10-CM

## 2019-06-06 DIAGNOSIS — I71.4 ABDOMINAL AORTIC ANEURYSM (AAA) WITHOUT RUPTURE (HCC): ICD-10-CM

## 2019-06-06 DIAGNOSIS — Z00.00 PHYSICAL EXAM, ANNUAL: Primary | ICD-10-CM

## 2019-06-06 DIAGNOSIS — J44.9 CHRONIC OBSTRUCTIVE PULMONARY DISEASE, UNSPECIFIED COPD TYPE (HCC): ICD-10-CM

## 2019-06-06 PROCEDURE — 80053 COMPREHEN METABOLIC PANEL: CPT

## 2019-06-06 PROCEDURE — G0439 PPPS, SUBSEQ VISIT: HCPCS | Performed by: INTERNAL MEDICINE

## 2019-06-06 PROCEDURE — 85025 COMPLETE CBC W/AUTO DIFF WBC: CPT

## 2019-06-06 PROCEDURE — 83036 HEMOGLOBIN GLYCOSYLATED A1C: CPT

## 2019-06-06 PROCEDURE — 80307 DRUG TEST PRSMV CHEM ANLYZR: CPT

## 2019-06-06 PROCEDURE — 80361 OPIATES 1 OR MORE: CPT

## 2019-06-06 PROCEDURE — 36415 COLL VENOUS BLD VENIPUNCTURE: CPT

## 2019-06-06 RX ORDER — LISINOPRIL 30 MG/1
30 TABLET ORAL DAILY
Qty: 90 TABLET | Refills: 3 | Status: SHIPPED | OUTPATIENT
Start: 2019-06-06

## 2019-06-06 RX ORDER — HYDROCODONE BITARTRATE AND ACETAMINOPHEN 10; 325 MG/1; MG/1
2 TABLET ORAL 3 TIMES DAILY PRN
Qty: 180 TABLET | Refills: 0 | Status: SHIPPED | OUTPATIENT
Start: 2019-06-06 | End: 2019-06-26

## 2019-06-06 RX ORDER — ALPRAZOLAM 1 MG/1
1 TABLET ORAL 2 TIMES DAILY PRN
COMMUNITY

## 2019-06-06 NOTE — PROGRESS NOTES
Jada Potter is a 59year old male. HPI:   Patient presents with:  Checkup: Patient reports that he had mesh placed and now a surgeon would like to take it out and he is skeptical about this.    Physical      58 y/o M here for subsequent Medicare a     right common iliac PTCA/stent- PV Dr Wagoner Fess   • APPENDECTOMY     • ELECTROCARDIOGRAM, COMPLETE      Scanned to media tab - DOS 10-   • OTHER SURGICAL HISTORY  2014    femoral bypass   • OTHER SURGICAL HISTORY  10/2014    stent Disp:  Rfl:    Montelukast Sodium (SINGULAIR) 10 MG Oral Tab Take 1 tablet by mouth nightly. Disp:  Rfl:    PARoxetine HCl 10 MG Oral Tab Take 1 tablet (10 mg total) by mouth every morning.  Disp: 30 tablet Rfl: 5   tamsulosin HCl (FLOMAX) 0.4 MG Oral Cap T Problems? : No    Difficulty walking?: Yes    Difficulty dressing or bathing?: No    Problems with daily activities? : Yes    Memory Problems?: No      Fall/Risk Assessment     Do you have 3 or more medical conditions?: 1-Yes    Have you fallen in the last GLUCOSE (P) (mg/dL)   Date Value   04/01/2016 117 (H)       Cardiovascular Disease Screening     LDL Annually LDL Cholesterol (mg/dL)   Date Value   06/08/2018 57   04/01/2016 132 (H)        EKG One Time     Colorectal Cancer Screening      Colonoscopy found.    Creat/alb ratio  Annually      LDL  Annually LDL Cholesterol (mg/dL)   Date Value   06/08/2018 57   04/01/2016 132 (H)    No flowsheet data found. Dilated Eye exam  Annually No flowsheet data found. No flowsheet data found.     COPD      Spir intact; light touch and proprioception intact  Skin: no rash or ulcerations               ASSESSMENT/PLAN:   Physical exam  colonoscopy on 7/16/18 which showed diverticulosis and internal hemorrhoids; had Pneumovax on 11/7/18    Hemoptysis  CT chest with c locale for prescriptions in [address] [place] [address] due to high physician turnover; will continue Rx as outlined above; will order drug abuse panel for narcotic monitoring  AAA   Pt s/p AAA endograft in Oct 2014 at [facility]; pt had repeat procedure by  torsemide 10 mg po qD  Prostatism  On Flomax 0.4 mg po qHS; nocturia x5; small urine volumes at night; discussed urology referral--> pt declines for now  Hypercholesterolemia   in April 132; did not tolerate Lipitor 20 mg, or pravastatin due to [employer]

## 2019-06-24 ENCOUNTER — TELEPHONE (OUTPATIENT)
Dept: INTERNAL MEDICINE CLINIC | Facility: CLINIC | Age: 59
End: 2019-06-24

## 2019-06-24 RX ORDER — PANTOPRAZOLE SODIUM 40 MG/1
40 TABLET, DELAYED RELEASE ORAL
Qty: 1 TABLET | Refills: 0 | COMMUNITY
Start: 2019-06-24 | End: 2021-11-22

## 2019-06-24 NOTE — TELEPHONE ENCOUNTER
Script for Esomeprazole 20mg capsules. Not covered by insurance will need prior authorization  Patient taking Pantoporazole which does the same thing as the Esomeprazole. Do you still want patient to have Esomeprazole?     Cm Montgomery from [place] [address]

## 2019-06-24 NOTE — TELEPHONE ENCOUNTER
Confirmed with Rock Dequan PharmD  Pt is on pantoprazole 40 mg daily by a different MD;  Profile updated

## 2019-06-25 NOTE — TELEPHONE ENCOUNTER
To Dr Ragini Ivory - Marlon PABON:  The above refill request is for a controlled substance. Please review pended medication order. Print and sign for staff to fax to pharmacy.

## 2019-06-26 RX ORDER — HYDROCODONE BITARTRATE AND ACETAMINOPHEN 10; 325 MG/1; MG/1
2 TABLET ORAL 3 TIMES DAILY PRN
Qty: 180 TABLET | Refills: 0 | Status: SHIPPED | OUTPATIENT
Start: 2019-06-26 | End: 2019-07-22

## 2019-06-27 NOTE — TELEPHONE ENCOUNTER
Drug panel on 6/6/19 was +oxycodone and neg hydrocodone; pt called; he had not taken hydrocodone x 3d as he was driving in from [place];      Imp- osteoarthritis; refilled oxycontin ER 40 mg po BID, #60 and Norco 10 mg two tabs po TID, #180; ICD 10 code fo

## 2019-07-22 NOTE — TELEPHONE ENCOUNTER
Pt called for refills of Norco & Oxycontin  Please mail to patient    Seeing lung specialist on 8/22, 2:10 pm appt   When does Dr Porter Hooper want to see patient?     Please advise [phone]

## 2019-07-22 NOTE — TELEPHONE ENCOUNTER
Imp- osteoarthritis; refilled oxycontin ER 40 mg po BID, #60 and Norco 10 mg two tabs po TID, #180; ICD 10 code for pt G89.4; next OV 4/22/19; Rx mailed    Also see me week of 8/22/19; he will need another opiate drug test to confirm usage of both oxyconti

## 2019-08-22 ENCOUNTER — TELEPHONE (OUTPATIENT)
Dept: PULMONOLOGY | Facility: CLINIC | Age: 59
End: 2019-08-22

## 2019-08-22 ENCOUNTER — OFFICE VISIT (OUTPATIENT)
Dept: INTERNAL MEDICINE CLINIC | Facility: CLINIC | Age: 59
End: 2019-08-22
Payer: MEDICARE

## 2019-08-22 ENCOUNTER — LAB ENCOUNTER (OUTPATIENT)
Dept: LAB | Age: 59
End: 2019-08-22
Attending: INTERNAL MEDICINE
Payer: MEDICARE

## 2019-08-22 ENCOUNTER — OFFICE VISIT (OUTPATIENT)
Dept: PULMONOLOGY | Facility: CLINIC | Age: 59
End: 2019-08-22
Payer: MEDICARE

## 2019-08-22 VITALS
HEART RATE: 103 BPM | DIASTOLIC BLOOD PRESSURE: 85 MMHG | HEIGHT: 69 IN | BODY MASS INDEX: 31.1 KG/M2 | RESPIRATION RATE: 18 BRPM | SYSTOLIC BLOOD PRESSURE: 128 MMHG | WEIGHT: 210 LBS | OXYGEN SATURATION: 94 %

## 2019-08-22 VITALS
TEMPERATURE: 98 F | HEART RATE: 100 BPM | SYSTOLIC BLOOD PRESSURE: 150 MMHG | HEIGHT: 70 IN | DIASTOLIC BLOOD PRESSURE: 80 MMHG | BODY MASS INDEX: 30.06 KG/M2 | WEIGHT: 210 LBS | OXYGEN SATURATION: 94 %

## 2019-08-22 DIAGNOSIS — F11.90 OPIATE USE: ICD-10-CM

## 2019-08-22 DIAGNOSIS — G89.4 CHRONIC PAIN SYNDROME: ICD-10-CM

## 2019-08-22 DIAGNOSIS — F17.200 SMOKING: ICD-10-CM

## 2019-08-22 DIAGNOSIS — M19.90 CHRONIC OSTEOARTHRITIS: ICD-10-CM

## 2019-08-22 DIAGNOSIS — R04.2 HEMOPTYSIS: ICD-10-CM

## 2019-08-22 DIAGNOSIS — I73.9 PVD (PERIPHERAL VASCULAR DISEASE) (HCC): ICD-10-CM

## 2019-08-22 DIAGNOSIS — M25.511 ACUTE PAIN OF RIGHT SHOULDER: Primary | ICD-10-CM

## 2019-08-22 DIAGNOSIS — J44.9 STAGE 4 VERY SEVERE COPD BY GOLD CLASSIFICATION (HCC): Primary | ICD-10-CM

## 2019-08-22 DIAGNOSIS — I10 ESSENTIAL HYPERTENSION: ICD-10-CM

## 2019-08-22 LAB
AMPHET UR QL SCN: NEGATIVE
BARBITURATES UR QL SCN: NEGATIVE
BENZODIAZ UR QL SCN: NEGATIVE
CANNABINOIDS UR QL SCN: NEGATIVE
COCAINE UR QL: NEGATIVE
MDMA UR QL SCN: NEGATIVE
METHADONE UR QL SCN: NEGATIVE
PCP UR QL SCN: NEGATIVE

## 2019-08-22 PROCEDURE — G0463 HOSPITAL OUTPT CLINIC VISIT: HCPCS | Performed by: INTERNAL MEDICINE

## 2019-08-22 PROCEDURE — 80361 OPIATES 1 OR MORE: CPT

## 2019-08-22 PROCEDURE — 80307 DRUG TEST PRSMV CHEM ANLYZR: CPT

## 2019-08-22 PROCEDURE — 94010 BREATHING CAPACITY TEST: CPT | Performed by: INTERNAL MEDICINE

## 2019-08-22 PROCEDURE — 99204 OFFICE O/P NEW MOD 45 MIN: CPT | Performed by: INTERNAL MEDICINE

## 2019-08-22 PROCEDURE — 99214 OFFICE O/P EST MOD 30 MIN: CPT | Performed by: INTERNAL MEDICINE

## 2019-08-22 RX ORDER — IPRATROPIUM BROMIDE AND ALBUTEROL SULFATE 2.5; .5 MG/3ML; MG/3ML
3 SOLUTION RESPIRATORY (INHALATION) 2 TIMES DAILY
Qty: 60 VIAL | Refills: 4 | Status: SHIPPED | OUTPATIENT
Start: 2019-08-22 | End: 2021-09-08

## 2019-08-22 RX ORDER — HYDROCODONE BITARTRATE AND ACETAMINOPHEN 10; 325 MG/1; MG/1
2 TABLET ORAL 3 TIMES DAILY PRN
Qty: 180 TABLET | Refills: 0 | Status: SHIPPED | OUTPATIENT
Start: 2019-08-22 | End: 2019-09-16

## 2019-08-22 NOTE — H&P
Referring Physician  Kathi Murphy MD    Chief Complaint  Occasional hemoptysis, dyspnea    History of Present Illness  Recent is a 49-year-old male who presents to pulmonary clinic for initial visit.   He lives in [facility] but frequently breakfast. Disp: 1 tablet Rfl: 0   lisinopril 30 MG Oral Tab Take 1 tablet (30 mg total) by mouth daily. Disp: 90 tablet Rfl: 3   torsemide 10 MG Oral Tab Take 1 tablet (10 mg total) by mouth daily.  (Patient taking differently: Take 10 mg by mouth daily as warm, dry  Lymphatic: no supraclavicular lymphadenopathy     Imaging  No CT chest available to review    Pulmonary Function Testing  Amatory in the office performed today revealed FEV1 1.03 30% of predicted, FVC 1.98 43% of predicted, FEV1/FVC 0.52.     Ass

## 2019-08-22 NOTE — TELEPHONE ENCOUNTER
CLAUDIA requesting Chest CT report and disc faxed to [address] [facility] records radiology dept at [phone]. Fax confirmation received. CLAUDIA sent to HIM for scanning.

## 2019-08-22 NOTE — PROGRESS NOTES
Santi Kerns is a 59year old male.     HPI:   Patient presents with:  Test Results      58 y/o M with c/o right shoulder pain x 2 weeks; he has h/o Right shoulder labral tear s/p arthroscopic repair by orthopedics Dr Lance Smith at [facility] in [facility] tablet Rfl: 0   Pantoprazole Sodium (PROTONIX) 40 MG Oral Tab EC Take 1 tablet (40 mg total) by mouth every morning before breakfast. Disp: 1 tablet Rfl: 0   ALPRAZolam 1 MG Oral Tab Take 1 mg by mouth 2 (two) times daily as needed.  Disp:  Rfl:    lisinopr no fatigue  Cardiovascular:  Negative for chest pain; negative palpitations  Respiratory:  Negative for cough, dyspnea and wheezing  Gastrointestinal:  Negative for abdominal pain, constipation, decreased appetite, diarrhea and vomiting; no melena or hemat which showed +steatosis and portal fibrosis     Osteoarthritis lumbar spine  s/p lumbar fusion in 2006 at [facility] chronic pain syndrome  of lumbar spine and hands, and right shoulder, and right knee; s/p lumbar fusion in 2006 at [facility]); +claudication RLE after walking one block; s/p CTA LE, venous insufficiency study, ECHO and MRI L-spine by Dr Serjio Burnett for further assessment of claudication; saw Dr Acosta Ribeiro in Nov 2018,and EMERSON showed excellent flow EMERSON on the right was 1.11 Prescriptions     Signed Prescriptions Disp Refills   • OXYCONTIN 40 MG Oral Tablet Extended Release 12 hour Abuse-Deterrent 60 tablet 0     Sig: Take 1 tablet (40 mg total) by mouth Q12H.   • HYDROcodone-acetaminophen (NORCO)  MG Oral Tab 180 tablet

## 2019-08-22 NOTE — TELEPHONE ENCOUNTER
They do not have a full chest CT - they have a run off from mid chest to toes - pls advise if you want this result

## 2019-08-23 NOTE — TELEPHONE ENCOUNTER
Lucita Gamboa ([place]) states they have a CXR and CT angio and she will send the report and disc for both. Lucita Gamboa states pt did not have Chest CT.

## 2019-08-26 LAB
OPIATES, UR, 6-ACETYLMORPHINE: <10 NG/ML
OPIATES, URINE, CODEINE: <20 NG/ML
OPIATES, URINE, HYDROCODONE: 3656 NG/ML
OPIATES, URINE, HYDROMORPHONE: 154 NG/ML
OPIATES, URINE, MORPHINE: <20 NG/ML
OPIATES, URINE, NORHYDROCODONE: 2241 NG/ML
OPIATES, URINE, NOROXYCODONE: 2314 NG/ML
OPIATES, URINE, NOROXYMORPHONE: 651 NG/ML
OPIATES, URINE, OXYCODONE: 2069 NG/ML
OPIATES, URINE, OXYMORPHONE: 102 NG/ML

## 2019-09-09 ENCOUNTER — TELEPHONE (OUTPATIENT)
Dept: PULMONOLOGY | Facility: CLINIC | Age: 59
End: 2019-09-09

## 2019-09-09 DIAGNOSIS — R04.2 HEMOPTYSIS: Primary | ICD-10-CM

## 2019-09-09 NOTE — TELEPHONE ENCOUNTER
Pt calling to les lopez rn, pt indicates [place] Dr's in [place] is asking what the plan of care is, pls call at:[phone],thanks.

## 2019-09-10 NOTE — TELEPHONE ENCOUNTER
Attempted to call patient but could not be reached. I did review his imaging that was provided from [place]. He will need to obtain CT chest.  I will provide further instructions when I talked to him on the phone. I will try him again tomorrow.

## 2019-09-11 NOTE — TELEPHONE ENCOUNTER
Discussed with patient. His previous chest x-ray performed in [place] revealed no significant abnormalities. Admits to some ongoing hemoptysis with some slight improvement. I have ordered CT chest with contrast for the patient.   Can you please call the

## 2019-09-16 ENCOUNTER — TELEPHONE (OUTPATIENT)
Dept: INTERNAL MEDICINE CLINIC | Facility: CLINIC | Age: 59
End: 2019-09-16

## 2019-09-16 RX ORDER — HYDROCODONE BITARTRATE AND ACETAMINOPHEN 10; 325 MG/1; MG/1
2 TABLET ORAL 3 TIMES DAILY PRN
Qty: 180 TABLET | Refills: 0 | Status: SHIPPED | OUTPATIENT
Start: 2019-09-16 | End: 2019-10-21

## 2019-09-16 NOTE — TELEPHONE ENCOUNTER
Drug panel on 8/22/19 confirmed use on Norco and oxycontin    Imp- osteoarthritis; refilled oxycontin ER 40 mg po BID, #60 and Norco 10 mg two tabs po TID, #180; ICD 10 code for pt G89.4; next OV 2/21/20; Rx mailed

## 2019-09-16 NOTE — TELEPHONE ENCOUNTER
TO MD:   The pended medication is for a schedule CII medication;   Please review   Print and sign if appropriate and staff will contact the patient for . To DR. MEDINA

## 2019-09-17 NOTE — TELEPHONE ENCOUNTER
Pt states he has the order for the Chest CT and will be scheduling the chest CT at [facility] in [place].

## 2019-10-01 ENCOUNTER — TELEPHONE (OUTPATIENT)
Dept: INTERNAL MEDICINE CLINIC | Facility: CLINIC | Age: 59
End: 2019-10-01

## 2019-10-01 NOTE — TELEPHONE ENCOUNTER
Pt is calling to request a call back from a nurse. Pt had a liver biopsy completed a week ago and needs a refill on his [address] it without Dr Marc Diallo approval.     Pt is due for the [address] on Friday, but is requesting the refill early.  Pt is w

## 2019-10-01 NOTE — TELEPHONE ENCOUNTER
Spoke to patient who reports that when he had a biopsy he had a tube down his throat and he had to \"take a couple more\" of the [address]. He states he is going out of town tonight and needs an OK for an early script to be filled Friday.      Last written 9/1

## 2019-10-01 NOTE — TELEPHONE ENCOUNTER
Pt called; pt had a bronchoscopy and lung biopsy at [place], [place] on 9/26/19 for a left-sided lung mass; CT chest was done at [address] [place] [address]; results are pending; (I do not have report of CT scan); pt is unable to give name of pulmonologist in

## 2019-10-21 RX ORDER — HYDROCODONE BITARTRATE AND ACETAMINOPHEN 10; 325 MG/1; MG/1
2 TABLET ORAL 3 TIMES DAILY PRN
Qty: 180 TABLET | Refills: 0 | Status: SHIPPED | OUTPATIENT
Start: 2019-10-21 | End: 2019-11-18

## 2019-10-21 NOTE — TELEPHONE ENCOUNTER
Patient asking for refill for;      Norco 10/325mg - #180      Oxycontin 40mg - #60        Mail both scripts to the patient.     Any questions, he can be reached at:  [phone]

## 2019-10-21 NOTE — TELEPHONE ENCOUNTER
To Dr. Kaelyn Beckwith----    The above refill request is for a controlled substance. Please review pended medication order. Print and sign for staff to notify pt. OK to mail?

## 2019-11-18 ENCOUNTER — TELEPHONE (OUTPATIENT)
Dept: INTERNAL MEDICINE CLINIC | Facility: CLINIC | Age: 59
End: 2019-11-18

## 2019-11-18 RX ORDER — HYDROCODONE BITARTRATE AND ACETAMINOPHEN 10; 325 MG/1; MG/1
2 TABLET ORAL 3 TIMES DAILY PRN
Qty: 180 TABLET | Refills: 0 | Status: SHIPPED | OUTPATIENT
Start: 2019-11-18 | End: 2019-12-09

## 2019-11-18 NOTE — TELEPHONE ENCOUNTER
Pt requesting refill for:  Norco  Oxycontin  Please mail to pt at home address (with stamp)  Tasked to [employer]

## 2019-11-19 NOTE — TELEPHONE ENCOUNTER
Imp- osteoarthritis; refilled oxycontin ER 40 mg po BID, #60 and Norco 10 mg two tabs po TID, #180; ICD 10 code for pt G89.4; next OV 2/21/20; Rx mailed

## 2019-12-09 RX ORDER — HYDROCODONE BITARTRATE AND ACETAMINOPHEN 10; 325 MG/1; MG/1
2 TABLET ORAL 3 TIMES DAILY PRN
Qty: 180 TABLET | Refills: 0 | Status: SHIPPED | OUTPATIENT
Start: 2019-12-19 | End: 2020-01-13

## 2019-12-09 NOTE — TELEPHONE ENCOUNTER
Pt requesting that refills for the following be mailed   Pt requesting next refills just to have as mail might be slow with the holiday  Norco  Oxycontin  Tasked to [employer]

## 2019-12-09 NOTE — TELEPHONE ENCOUNTER
To MD:  The above refill request is for a controlled substance. Please review pended medication order. Print and sign for staff to fax to pharmacy or prescribe electronically.     [place]  -   Hydrocodone - 12/1/2019 #180  Oxycontine 8/17/2019 #40     Last

## 2020-01-13 ENCOUNTER — TELEPHONE (OUTPATIENT)
Dept: INTERNAL MEDICINE CLINIC | Facility: CLINIC | Age: 60
End: 2020-01-13

## 2020-01-13 RX ORDER — HYDROCODONE BITARTRATE AND ACETAMINOPHEN 10; 325 MG/1; MG/1
2 TABLET ORAL 3 TIMES DAILY PRN
Qty: 180 TABLET | Refills: 0 | Status: SHIPPED | OUTPATIENT
Start: 2020-01-13 | End: 2020-02-10

## 2020-01-13 NOTE — TELEPHONE ENCOUNTER
TO MD:   The pended medication is for a schedule CII medication;   Please review   Print and sign if appropriate and staff will contact the patient for . Last written:    12/19/19 Norco #180  12/19/19 Oxycontin #60    Not seen on [place] . Patient requesting scripts be mailed so pended as print scripts.

## 2020-01-13 NOTE — TELEPHONE ENCOUNTER
Pt requesting that refills for:  Norco  Oxycontin   Be mailed to him (with stamp)  Tasked to [employer]

## 2020-02-10 ENCOUNTER — TELEPHONE (OUTPATIENT)
Dept: INTERNAL MEDICINE CLINIC | Facility: CLINIC | Age: 60
End: 2020-02-10

## 2020-02-10 RX ORDER — HYDROCODONE BITARTRATE AND ACETAMINOPHEN 10; 325 MG/1; MG/1
2 TABLET ORAL 3 TIMES DAILY PRN
Qty: 180 TABLET | Refills: 0 | Status: SHIPPED | OUTPATIENT
Start: 2020-02-10 | End: 2020-03-12

## 2020-02-10 NOTE — TELEPHONE ENCOUNTER
To MD:  The above refill request is for a controlled substance. Please review pended medication order. Print and sign for staff to fax to pharmacy or prescribe electronically.       Pt wants scripts mailed to his home     Last refilled -     OXYcontin -

## 2020-03-11 ENCOUNTER — TELEPHONE (OUTPATIENT)
Dept: INTERNAL MEDICINE CLINIC | Facility: CLINIC | Age: 60
End: 2020-03-11

## 2020-03-12 RX ORDER — HYDROCODONE BITARTRATE AND ACETAMINOPHEN 10; 325 MG/1; MG/1
2 TABLET ORAL 3 TIMES DAILY PRN
Qty: 180 TABLET | Refills: 0 | Status: SHIPPED | OUTPATIENT
Start: 2020-03-12 | End: 2020-04-02

## 2020-03-12 NOTE — TELEPHONE ENCOUNTER
To MD:  The above refill request is for a controlled substance. Please review pended medication order. Print and sign for staff to fax to pharmacy or prescribe electronically.         Last refilled -    Norco - 2/10/2020 #180/0    Oxycontin - 2/10/2020 #

## 2020-03-12 NOTE — TELEPHONE ENCOUNTER
Imp- osteoarthritis; refilled oxycontin ER 40 mg po BID, #60 and Norco 10 mg two tabs po TID, #180; ICD 10 code for pt G89.4; next OV 5/8/20; Rx mailed

## 2020-03-31 NOTE — TELEPHONE ENCOUNTER
To Dr. Shena Monsalve - spoke with [facility] - oxycontin was picked up by pt (refilled 3/12/20), pended med did not go thru/was not received by pharmacy. Please resend.

## 2020-04-02 RX ORDER — HYDROCODONE BITARTRATE AND ACETAMINOPHEN 10; 325 MG/1; MG/1
2 TABLET ORAL 3 TIMES DAILY PRN
Qty: 180 TABLET | Refills: 0 | Status: SHIPPED | OUTPATIENT
Start: 2020-04-02 | End: 2020-04-02

## 2020-04-02 RX ORDER — HYDROCODONE BITARTRATE AND ACETAMINOPHEN 10; 325 MG/1; MG/1
TABLET ORAL
Qty: 180 TABLET | Refills: 0 | OUTPATIENT
Start: 2020-04-02

## 2020-04-02 RX ORDER — HYDROCODONE BITARTRATE AND ACETAMINOPHEN 10; 325 MG/1; MG/1
2 TABLET ORAL 3 TIMES DAILY PRN
Qty: 180 TABLET | Refills: 0 | Status: SHIPPED | OUTPATIENT
Start: 2020-04-11 | End: 2020-04-22

## 2020-04-22 RX ORDER — HYDROCODONE BITARTRATE AND ACETAMINOPHEN 10; 325 MG/1; MG/1
2 TABLET ORAL 3 TIMES DAILY PRN
Qty: 180 TABLET | Refills: 0 | Status: SHIPPED | OUTPATIENT
Start: 2020-04-22 | End: 2020-05-21

## 2020-05-07 ENCOUNTER — TELEPHONE (OUTPATIENT)
Dept: INTERNAL MEDICINE CLINIC | Facility: CLINIC | Age: 60
End: 2020-05-07

## 2020-05-07 RX ORDER — HYDROCODONE BITARTRATE AND ACETAMINOPHEN 10; 325 MG/1; MG/1
TABLET ORAL
Qty: 1 TABLET | Refills: 0 | OUTPATIENT
Start: 2020-05-07

## 2020-05-07 NOTE — TELEPHONE ENCOUNTER
To MD:  The above refill request is for a controlled substance. Please review pended medication order. Print and sign for staff to fax to pharmacy or prescribe electronically.      Last refilled - 4/22/2020 #180/0

## 2020-05-18 ENCOUNTER — TELEPHONE (OUTPATIENT)
Dept: INTERNAL MEDICINE CLINIC | Facility: CLINIC | Age: 60
End: 2020-05-18

## 2020-05-18 NOTE — TELEPHONE ENCOUNTER
To Dr. Estefania Marcos---    The above refill request is for a controlled substance. Please review pended medication order. Print and sign for staff to fax to pharmacy.

## 2020-05-18 NOTE — TELEPHONE ENCOUNTER
Pt called, requesting refill for:  Norco  Oxycontin  Please mail these prescriptions to pt at home address  Tasked to RX    Pt will call back this afternoon to schedule appt

## 2020-05-20 RX ORDER — HYDROCODONE BITARTRATE AND ACETAMINOPHEN 10; 325 MG/1; MG/1
2 TABLET ORAL 3 TIMES DAILY PRN
Qty: 180 TABLET | Refills: 0 | OUTPATIENT
Start: 2020-05-20

## 2020-05-21 RX ORDER — HYDROCODONE BITARTRATE AND ACETAMINOPHEN 10; 325 MG/1; MG/1
2 TABLET ORAL 3 TIMES DAILY PRN
Qty: 180 TABLET | Refills: 0 | Status: SHIPPED | OUTPATIENT
Start: 2020-05-21 | End: 2020-05-21 | Stop reason: CLARIF

## 2020-05-21 RX ORDER — HYDROCODONE BITARTRATE AND ACETAMINOPHEN 10; 325 MG/1; MG/1
2 TABLET ORAL 3 TIMES DAILY PRN
Qty: 180 TABLET | Refills: 0 | Status: SHIPPED | OUTPATIENT
Start: 2020-05-21 | End: 2020-06-19

## 2020-05-21 NOTE — TELEPHONE ENCOUNTER
Imp- osteoarthritis; refilled oxycontin ER 40 mg po BID, #60 and Norco 10 mg two tabs po TID, #180; ICD 10 code for pt G89.4; next OV 6/8/20; Rx mailed

## 2020-05-21 NOTE — TELEPHONE ENCOUNTER
Pt contacted, appt scheduled for 6/8/20 based on social security check arriving on 6/6/20  Tasked to [employer]

## 2020-06-08 ENCOUNTER — LAB ENCOUNTER (OUTPATIENT)
Dept: LAB | Age: 60
End: 2020-06-08
Attending: INTERNAL MEDICINE
Payer: MEDICARE

## 2020-06-08 ENCOUNTER — OFFICE VISIT (OUTPATIENT)
Dept: INTERNAL MEDICINE CLINIC | Facility: CLINIC | Age: 60
End: 2020-06-08
Payer: MEDICARE

## 2020-06-08 VITALS
RESPIRATION RATE: 16 BRPM | TEMPERATURE: 97 F | SYSTOLIC BLOOD PRESSURE: 122 MMHG | WEIGHT: 217 LBS | HEART RATE: 104 BPM | BODY MASS INDEX: 32.14 KG/M2 | OXYGEN SATURATION: 94 % | DIASTOLIC BLOOD PRESSURE: 84 MMHG | HEIGHT: 69 IN

## 2020-06-08 DIAGNOSIS — Z12.5 SCREENING PSA (PROSTATE SPECIFIC ANTIGEN): ICD-10-CM

## 2020-06-08 DIAGNOSIS — M19.90 OSTEOARTHRITIS, UNSPECIFIED OSTEOARTHRITIS TYPE, UNSPECIFIED SITE: ICD-10-CM

## 2020-06-08 DIAGNOSIS — G89.4 CHRONIC PAIN SYNDROME: ICD-10-CM

## 2020-06-08 DIAGNOSIS — I71.4 ABDOMINAL AORTIC ANEURYSM (AAA) WITHOUT RUPTURE (HCC): ICD-10-CM

## 2020-06-08 DIAGNOSIS — Z00.00 PHYSICAL EXAM, ANNUAL: Primary | ICD-10-CM

## 2020-06-08 DIAGNOSIS — I10 ESSENTIAL HYPERTENSION: ICD-10-CM

## 2020-06-08 DIAGNOSIS — K76.0 HEPATIC STEATOSIS: ICD-10-CM

## 2020-06-08 DIAGNOSIS — Z79.891 LONG TERM PRESCRIPTION OPIATE USE: ICD-10-CM

## 2020-06-08 DIAGNOSIS — E78.00 PURE HYPERCHOLESTEROLEMIA: ICD-10-CM

## 2020-06-08 DIAGNOSIS — J44.9 CHRONIC OBSTRUCTIVE PULMONARY DISEASE, UNSPECIFIED COPD TYPE (HCC): ICD-10-CM

## 2020-06-08 DIAGNOSIS — E11.9 TYPE 2 DIABETES MELLITUS WITHOUT COMPLICATION, UNSPECIFIED WHETHER LONG TERM INSULIN USE (HCC): ICD-10-CM

## 2020-06-08 DIAGNOSIS — K29.70 GASTRITIS WITHOUT BLEEDING, UNSPECIFIED CHRONICITY, UNSPECIFIED GASTRITIS TYPE: ICD-10-CM

## 2020-06-08 DIAGNOSIS — I73.9 PVD (PERIPHERAL VASCULAR DISEASE) (HCC): ICD-10-CM

## 2020-06-08 DIAGNOSIS — R04.2 HEMOPTYSIS: ICD-10-CM

## 2020-06-08 DIAGNOSIS — G25.81 RLS (RESTLESS LEGS SYNDROME): ICD-10-CM

## 2020-06-08 DIAGNOSIS — K64.8 INTERNAL HEMORRHOIDS: ICD-10-CM

## 2020-06-08 DIAGNOSIS — S43.431S LABRAL TEAR OF SHOULDER, RIGHT, SEQUELA: ICD-10-CM

## 2020-06-08 PROCEDURE — 36415 COLL VENOUS BLD VENIPUNCTURE: CPT

## 2020-06-08 PROCEDURE — G0439 PPPS, SUBSEQ VISIT: HCPCS | Performed by: INTERNAL MEDICINE

## 2020-06-08 PROCEDURE — 85025 COMPLETE CBC W/AUTO DIFF WBC: CPT

## 2020-06-08 PROCEDURE — 84443 ASSAY THYROID STIM HORMONE: CPT

## 2020-06-08 PROCEDURE — 80346 BENZODIAZEPINES1-12: CPT

## 2020-06-08 PROCEDURE — 83036 HEMOGLOBIN GLYCOSYLATED A1C: CPT

## 2020-06-08 PROCEDURE — 80053 COMPREHEN METABOLIC PANEL: CPT

## 2020-06-08 PROCEDURE — 80061 LIPID PANEL: CPT

## 2020-06-08 PROCEDURE — 80361 OPIATES 1 OR MORE: CPT

## 2020-06-08 PROCEDURE — 80307 DRUG TEST PRSMV CHEM ANLYZR: CPT

## 2020-06-08 RX ORDER — TAMSULOSIN HYDROCHLORIDE 0.4 MG/1
0.4 CAPSULE ORAL DAILY
Qty: 90 CAPSULE | Refills: 3 | Status: SHIPPED | OUTPATIENT
Start: 2020-06-08
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
## 2020-06-15 ENCOUNTER — TELEPHONE (OUTPATIENT)
Dept: INTERNAL MEDICINE CLINIC | Facility: CLINIC | Age: 60
End: 2020-06-15

## 2020-06-19 RX ORDER — HYDROCODONE BITARTRATE AND ACETAMINOPHEN 10; 325 MG/1; MG/1
2 TABLET ORAL 3 TIMES DAILY PRN
Qty: 180 TABLET | Refills: 0 | Status: SHIPPED | OUTPATIENT
Start: 2020-06-19 | End: 2020-07-15

## 2020-06-19 NOTE — TELEPHONE ENCOUNTER
A1C 6.4%; diet-controlled DM; alk phos 158; other labs OK; pt called    Imp- osteoarthritis; refilled oxycontin ER 40 mg po BID, #60 and Norco 10 mg two tabs po TID, #180; ICD 10 code for pt G89.4; pt called; Rx mailed

## 2020-06-19 NOTE — TELEPHONE ENCOUNTER
Patient calling checking on status of mailed prescriptions. Leaving for [place] end of this month. Would like test results for 6/8/2020 labs.     Best number to contact patient at [phone]

## 2020-07-15 ENCOUNTER — TELEPHONE (OUTPATIENT)
Dept: INTERNAL MEDICINE CLINIC | Facility: CLINIC | Age: 60
End: 2020-07-15

## 2020-07-15 RX ORDER — HYDROCODONE BITARTRATE AND ACETAMINOPHEN 10; 325 MG/1; MG/1
2 TABLET ORAL 3 TIMES DAILY PRN
Qty: 180 TABLET | Refills: 0 | Status: SHIPPED | OUTPATIENT
Start: 2020-07-15 | End: 2020-08-17

## 2020-07-15 RX ORDER — HYDROCODONE BITARTRATE AND ACETAMINOPHEN 10; 325 MG/1; MG/1
2 TABLET ORAL 3 TIMES DAILY PRN
Qty: 180 TABLET | Refills: 0 | Status: CANCELLED | OUTPATIENT
Start: 2020-07-15

## 2020-07-15 NOTE — TELEPHONE ENCOUNTER
To MD:  The above refill request is for a controlled substance. Please review pended medication order. Print and sign for staff to fax to pharmacy or prescribe electronically. To DR. MEDINA - patient wants it mailed

## 2020-07-15 NOTE — TELEPHONE ENCOUNTER
Pt called, requested refills for:  Norco  Oxycontin  Pt asked that these be mailed to pt at home address  Tasked to [employer]

## 2020-07-15 NOTE — TELEPHONE ENCOUNTER
Imp- osteoarthritis; refilled oxycontin ER 40 mg po BID, #60 and Norco 10 mg two tabs po TID, #180; ICD 10 code for pt G89.4; pt called; Rx mailed

## 2020-08-17 ENCOUNTER — TELEPHONE (OUTPATIENT)
Dept: INTERNAL MEDICINE CLINIC | Facility: CLINIC | Age: 60
End: 2020-08-17

## 2020-08-17 RX ORDER — HYDROCODONE BITARTRATE AND ACETAMINOPHEN 10; 325 MG/1; MG/1
2 TABLET ORAL 3 TIMES DAILY PRN
Qty: 180 TABLET | Refills: 0 | Status: SHIPPED | OUTPATIENT
Start: 2020-08-17 | End: 2020-09-14

## 2020-08-17 RX ORDER — OXYCODONE HYDROCHLORIDE 15 MG/1
30 TABLET, FILM COATED, EXTENDED RELEASE ORAL EVERY 12 HOURS PRN
Qty: 120 EACH | Refills: 0 | Status: SHIPPED | OUTPATIENT
Start: 2020-08-17 | End: 2020-09-14

## 2020-08-17 NOTE — TELEPHONE ENCOUNTER
Imp- chronic pain syndrome    -since chronic pain has lessened, will attempt decrease oxycontin CR to 15 mg two tabs (=30 mg) po BID, #120,  and Norco 10 mg two tabs po TID, #180; ICD 10 code for pt G89.4; pt called; Rx mailed

## 2020-08-17 NOTE — TELEPHONE ENCOUNTER
To MD:  The above refill request is for a controlled substance. Please review pended medication order. Print and sign for staff to fax to pharmacy or prescribe electronically. [employer] - unable to see [facility] records.        Norco     Last refilled 7/15/2

## 2020-08-17 NOTE — TELEPHONE ENCOUNTER
[facility] site checked for [place] fills; Appropriate  To DR. CAROL blanchard OV note suggests may lower dose of Oxycontin? ??

## 2020-09-14 ENCOUNTER — TELEPHONE (OUTPATIENT)
Dept: INTERNAL MEDICINE CLINIC | Facility: CLINIC | Age: 60
End: 2020-09-14

## 2020-09-14 RX ORDER — OXYCODONE HYDROCHLORIDE 15 MG/1
30 TABLET, FILM COATED, EXTENDED RELEASE ORAL EVERY 12 HOURS PRN
Qty: 120 EACH | Refills: 0 | Status: SHIPPED | OUTPATIENT
Start: 2020-09-14 | End: 2020-10-14

## 2020-09-14 RX ORDER — HYDROCODONE BITARTRATE AND ACETAMINOPHEN 10; 325 MG/1; MG/1
2 TABLET ORAL 3 TIMES DAILY PRN
Qty: 180 TABLET | Refills: 0 | Status: SHIPPED | OUTPATIENT
Start: 2020-09-14 | End: 2020-10-12

## 2020-09-14 NOTE — TELEPHONE ENCOUNTER
Patient requesting refills on [address] [place] [address]. Patient would like scripts mailed to him.      PO  [facility] [address]

## 2020-09-14 NOTE — TELEPHONE ENCOUNTER
Imp- chronic pain syndrome   on oxycontin CR to 15 mg two tabs (=30 mg) po BID, #120,  and Norco 10 mg two tabs po TID, #180; ICD 10 code for pt G89.4; pt called; Rx mailed

## 2020-09-16 NOTE — TELEPHONE ENCOUNTER
Pt. Called back to check status of Rx he has to drive to [facility] today by 3pm and would like to have his meds before his six hour drive

## 2020-09-16 NOTE — TELEPHONE ENCOUNTER
Pt. Is calling he was told by the pharmacy that he needs a prior auth in order to fill his Rx. His brother in law passed away yesterday and he has to drive six hours to [facility] to  people ph.  # [phone] routed high to Rx

## 2020-09-17 RX ORDER — OXYCODONE HYDROCHLORIDE 30 MG/1
30 TABLET, FILM COATED, EXTENDED RELEASE ORAL 2 TIMES DAILY
Qty: 60 EACH | Refills: 0 | Status: SHIPPED | OUTPATIENT
Start: 2020-09-17 | End: 2020-10-17

## 2020-09-17 RX ORDER — OXYCODONE HYDROCHLORIDE 30 MG/1
30 TABLET, FILM COATED, EXTENDED RELEASE ORAL 2 TIMES DAILY
Qty: 60 EACH | Refills: 0 | Status: SHIPPED | OUTPATIENT
Start: 2020-09-17 | End: 2020-09-17

## 2020-09-17 NOTE — TELEPHONE ENCOUNTER
S/w pharmacist again as patient called them to inquire about paying out of pocket for a certain # of tablets, however that would void the remainder tablets on the script and a new script would be needed.     Instead, the pharmacist states that his insurance

## 2020-09-17 NOTE — TELEPHONE ENCOUNTER
[facility] requesting PA  Oxycontin CR 12 HR 15 mg tab   ID [id number], [phone]  Placed in purple foder

## 2020-09-17 NOTE — TELEPHONE ENCOUNTER
Our office hasn't received a PA form. Called [facility] and s/w pharmacist who will fax form. Patient notified that PA process will be started today and authorization takes 3-5 days.       To Rosa inbox

## 2020-09-17 NOTE — TELEPHONE ENCOUNTER
Patient called  Spoke with Dr Raymundo Castro last night   re: Prior Auth needed for his Oxycontin 15  Please contact [address] [place] [address] BS#[phone]  Patient spoke to pharmacy this morning and advises they did not hear from us   Pt needs to  refill today

## 2020-10-12 ENCOUNTER — TELEPHONE (OUTPATIENT)
Dept: INTERNAL MEDICINE CLINIC | Facility: CLINIC | Age: 60
End: 2020-10-12

## 2020-10-12 RX ORDER — HYDROCODONE BITARTRATE AND ACETAMINOPHEN 10; 325 MG/1; MG/1
2 TABLET ORAL 3 TIMES DAILY PRN
Qty: 180 TABLET | Refills: 0 | Status: SHIPPED | OUTPATIENT
Start: 2020-10-12 | End: 2020-11-07

## 2020-10-12 RX ORDER — OXYCODONE HYDROCHLORIDE 30 MG/1
30 TABLET, FILM COATED, EXTENDED RELEASE ORAL 2 TIMES DAILY
Qty: 60 EACH | Refills: 0 | Status: SHIPPED | OUTPATIENT
Start: 2020-10-12 | End: 2020-11-07

## 2020-10-12 NOTE — TELEPHONE ENCOUNTER
Patient calling for refill has family emergency. Having to travel by car to [facility] for wife's surgery. Asking for Oxycontin 30 mg tablets (pharmacy will not give 15mg tablets) 60 tablets. Needs asap, needs to get on the road. Send to [employer]

## 2020-10-12 NOTE — TELEPHONE ENCOUNTER
To Dr Halima Escalera see messages from patient below asking for OxyContin 30 mg refill asap due to family emergency

## 2020-10-12 NOTE — TELEPHONE ENCOUNTER
Imp- chronic pain syndrome   on oxycodone ER 30 mg  po BID, #60,  and Norco 10 mg two tabs po TID, #180; ICD 10 code for pt G89.4; pt called; ERx sent; pt called

## 2020-10-12 NOTE — TELEPHONE ENCOUNTER
Patient calling checking on the status of refill. Pharmacy closes early and patient needs to get on the road to [place].

## 2020-11-03 NOTE — TELEPHONE ENCOUNTER
To Dr. Darlene Leslie - danelle Washington: Sallie Arriaga last fill date: Refilled oxycontin 10/13/20, #60.  melanie Martini fill: 10/17/20 #180.

## 2020-11-06 ENCOUNTER — TELEPHONE (OUTPATIENT)
Dept: INTERNAL MEDICINE CLINIC | Facility: CLINIC | Age: 60
End: 2020-11-06

## 2020-11-07 RX ORDER — OXYCODONE HYDROCHLORIDE 30 MG/1
TABLET, FILM COATED, EXTENDED RELEASE ORAL
Qty: 60 EACH | Refills: 0 | Status: SHIPPED | OUTPATIENT
Start: 2020-11-13 | End: 2020-12-07

## 2020-11-07 RX ORDER — HYDROCODONE BITARTRATE AND ACETAMINOPHEN 10; 325 MG/1; MG/1
2 TABLET ORAL 3 TIMES DAILY PRN
Qty: 180 TABLET | Refills: 0 | Status: SHIPPED | OUTPATIENT
Start: 2020-11-07 | End: 2020-12-14

## 2020-11-08 NOTE — TELEPHONE ENCOUNTER
Imp- chronic pain syndrome   on oxycodone ER 30 mg  po BID, #60,  and Norco 10 mg two tabs po TID, #180; ICD 10 code for pt G89.4;  ERx sent;

## 2020-11-16 NOTE — TELEPHONE ENCOUNTER
FAX RECEIVED, GIVEN TO NURSE   [facility] faxed a request for additional information  Re: Oxycodone HCL ER. Placed in purple folder.

## 2020-12-07 ENCOUNTER — TELEPHONE (OUTPATIENT)
Dept: INTERNAL MEDICINE CLINIC | Facility: CLINIC | Age: 60
End: 2020-12-07

## 2020-12-07 RX ORDER — OXYCODONE HYDROCHLORIDE 30 MG/1
TABLET, FILM COATED, EXTENDED RELEASE ORAL
Qty: 60 EACH | Refills: 0 | Status: SHIPPED | OUTPATIENT
Start: 2020-12-07 | End: 2021-01-05

## 2020-12-07 NOTE — TELEPHONE ENCOUNTER
Imp- chronic pain syndrome   on oxycodone ER 30 mg  po BID, #60; ICD 10 code for pt G89.4; pt called; ERx sent;

## 2020-12-12 ENCOUNTER — TELEPHONE (OUTPATIENT)
Dept: INTERNAL MEDICINE CLINIC | Facility: CLINIC | Age: 60
End: 2020-12-12

## 2020-12-14 RX ORDER — HYDROCODONE BITARTRATE AND ACETAMINOPHEN 10; 325 MG/1; MG/1
2 TABLET ORAL 3 TIMES DAILY PRN
Qty: 180 TABLET | Refills: 0 | Status: SHIPPED | OUTPATIENT
Start: 2020-12-14 | End: 2021-01-11

## 2020-12-14 NOTE — TELEPHONE ENCOUNTER
To MD:  The above refill request is for a controlled substance. Please review pended medication order. Print and sign for staff to fax to pharmacy or prescribe electronically.       Last refilled - 11/7/2020

## 2020-12-14 NOTE — TELEPHONE ENCOUNTER
Imp- chronic pain syndrome   on Norco 10 mg two tabs po TID prn, #180, no RF; ICD 10 code for pt G89.4; ERx sent;

## 2020-12-21 NOTE — TELEPHONE ENCOUNTER
Last OV 10/14/20  Future Appointments   Date Time Provider Charu Charo   1/5/2021  2:20 PM MD ALEXANDER Mark   4/19/2021 10:40 AM MD Billy Oakley [facility] Patient needs refill for:    Norco 10/325mg    Oxycontin 40mg        Patient wants these mailed to him.

## 2021-01-05 ENCOUNTER — TELEPHONE (OUTPATIENT)
Dept: INTERNAL MEDICINE CLINIC | Facility: CLINIC | Age: 61
End: 2021-01-05

## 2021-01-06 RX ORDER — OXYCODONE HYDROCHLORIDE 30 MG/1
TABLET, FILM COATED, EXTENDED RELEASE ORAL
Qty: 60 EACH | Refills: 0 | Status: SHIPPED | OUTPATIENT
Start: 2021-01-06 | End: 2021-02-01

## 2021-01-07 ENCOUNTER — TELEPHONE (OUTPATIENT)
Dept: INTERNAL MEDICINE CLINIC | Facility: CLINIC | Age: 61
End: 2021-01-07

## 2021-01-07 NOTE — TELEPHONE ENCOUNTER
Imp- chronic pain syndrome   on oxycodone ER 30 mg  po BID, #60; ICD 10 code for pt G89.4; ERx sent;

## 2021-01-07 NOTE — TELEPHONE ENCOUNTER
Pt living in [place], [place]; Dx- PVD    He wishes to pursue consultation at [facility]    Pt referred to vascular surgeon, Dr Brian Baires,  [address] [facility].  [address] [facility] [address], [place] Wale Ramos called

## 2021-01-07 NOTE — TELEPHONE ENCOUNTER
Pt. Is calling because one of his other [de-identified] wants him to see a surgeon ph.  # [phone]   Routed to clinical

## 2021-01-07 NOTE — TELEPHONE ENCOUNTER
To Dr. Vicenet Lim to please advise-----  Pt was advised by thoracic surgeon who operated on him 21 years ago (Dr. Tono Damon in [address], [address])  that he will require surgery related to pt's PVD.  Pt calling to request a second opinion from Dr. Amanda Bledsoe also asking

## 2021-01-11 ENCOUNTER — TELEPHONE (OUTPATIENT)
Dept: INTERNAL MEDICINE CLINIC | Facility: CLINIC | Age: 61
End: 2021-01-11

## 2021-01-11 RX ORDER — HYDROCODONE BITARTRATE AND ACETAMINOPHEN 10; 325 MG/1; MG/1
2 TABLET ORAL 3 TIMES DAILY PRN
Qty: 180 TABLET | Refills: 0 | Status: SHIPPED | OUTPATIENT
Start: 2021-01-11 | End: 2021-02-01

## 2021-02-01 ENCOUNTER — TELEPHONE (OUTPATIENT)
Dept: INTERNAL MEDICINE CLINIC | Facility: CLINIC | Age: 61
End: 2021-02-01

## 2021-02-01 RX ORDER — HYDROCODONE BITARTRATE AND ACETAMINOPHEN 10; 325 MG/1; MG/1
2 TABLET ORAL 3 TIMES DAILY PRN
Qty: 180 TABLET | Refills: 0 | Status: SHIPPED | OUTPATIENT
Start: 2021-02-01 | End: 2021-03-03

## 2021-02-01 RX ORDER — OXYCODONE HYDROCHLORIDE 30 MG/1
TABLET, FILM COATED, EXTENDED RELEASE ORAL
Qty: 60 EACH | Refills: 0 | Status: SHIPPED | OUTPATIENT
Start: 2021-02-01 | End: 2021-03-03

## 2021-02-01 NOTE — TELEPHONE ENCOUNTER
Imp- chronic pain syndrome     -ERx sent for oxycontin CR 30 mg po BID, #60,  and Norco 10 mg two tabs po TID, #180; ICD 10 code for pt G89.4;

## 2021-02-01 NOTE — TELEPHONE ENCOUNTER
To MD:  The above refill request is for a controlled substance. Please review pended medication order. Print and sign for staff to fax to pharmacy or prescribe electronically.       Hydrocodone     Last office visit:6/8/2020  Last time refill sent and qu

## 2021-03-02 NOTE — TELEPHONE ENCOUNTER
To Dr. Thalia Mason MD:  The above refill request is for a controlled substance. Please review pended medication order. Print and sign for staff to fax to pharmacy or prescribe electronically.     Last office visit: 6/8/20  Last time refill sent and

## 2021-03-03 RX ORDER — OXYCODONE HYDROCHLORIDE 30 MG/1
TABLET, FILM COATED, EXTENDED RELEASE ORAL
Qty: 60 EACH | Refills: 0 | Status: SHIPPED | OUTPATIENT
Start: 2021-03-03 | End: 2021-03-29

## 2021-03-03 RX ORDER — HYDROCODONE BITARTRATE AND ACETAMINOPHEN 10; 325 MG/1; MG/1
2 TABLET ORAL 3 TIMES DAILY PRN
Qty: 180 TABLET | Refills: 0 | Status: SHIPPED | OUTPATIENT
Start: 2021-03-03 | End: 2021-03-29

## 2021-03-17 DIAGNOSIS — Z23 NEED FOR VACCINATION: ICD-10-CM

## 2021-03-29 ENCOUNTER — TELEPHONE (OUTPATIENT)
Dept: INTERNAL MEDICINE CLINIC | Facility: CLINIC | Age: 61
End: 2021-03-29

## 2021-03-29 RX ORDER — HYDROCODONE BITARTRATE AND ACETAMINOPHEN 10; 325 MG/1; MG/1
2 TABLET ORAL 3 TIMES DAILY PRN
Qty: 180 TABLET | Refills: 0 | Status: SHIPPED | OUTPATIENT
Start: 2021-03-29 | End: 2021-05-03

## 2021-03-29 RX ORDER — OXYCODONE HYDROCHLORIDE 30 MG/1
1 TABLET, FILM COATED, EXTENDED RELEASE ORAL 2 TIMES DAILY
Qty: 60 EACH | Refills: 0 | Status: SHIPPED | OUTPATIENT
Start: 2021-03-29 | End: 2021-05-03

## 2021-03-29 NOTE — TELEPHONE ENCOUNTER
To MD:  The above refill request is for a controlled substance. Please review pended medication order. Print and sign for staff to fax to pharmacy or prescribe electronically.     Last office visit: 6/8/2020  Last time refill sent and quantity/refills:

## 2021-04-16 ENCOUNTER — OFFICE VISIT (OUTPATIENT)
Dept: INTERNAL MEDICINE CLINIC | Facility: CLINIC | Age: 61
End: 2021-04-16
Payer: MEDICARE

## 2021-04-16 VITALS
OXYGEN SATURATION: 97 % | TEMPERATURE: 98 F | BODY MASS INDEX: 34.31 KG/M2 | DIASTOLIC BLOOD PRESSURE: 86 MMHG | SYSTOLIC BLOOD PRESSURE: 140 MMHG | HEIGHT: 68.3 IN | WEIGHT: 226.38 LBS | HEART RATE: 111 BPM

## 2021-04-16 DIAGNOSIS — K64.8 INTERNAL HEMORRHOIDS: ICD-10-CM

## 2021-04-16 DIAGNOSIS — G89.4 CHRONIC PAIN SYNDROME: ICD-10-CM

## 2021-04-16 DIAGNOSIS — I73.9 PVD (PERIPHERAL VASCULAR DISEASE) (HCC): ICD-10-CM

## 2021-04-16 DIAGNOSIS — M19.90 OSTEOARTHRITIS, UNSPECIFIED OSTEOARTHRITIS TYPE, UNSPECIFIED SITE: ICD-10-CM

## 2021-04-16 DIAGNOSIS — K76.0 HEPATIC STEATOSIS: ICD-10-CM

## 2021-04-16 DIAGNOSIS — I10 ESSENTIAL HYPERTENSION: ICD-10-CM

## 2021-04-16 DIAGNOSIS — K29.70 GASTRITIS WITHOUT BLEEDING, UNSPECIFIED CHRONICITY, UNSPECIFIED GASTRITIS TYPE: ICD-10-CM

## 2021-04-16 DIAGNOSIS — L98.9 FACIAL LESION: Primary | ICD-10-CM

## 2021-04-16 DIAGNOSIS — J44.9 CHRONIC OBSTRUCTIVE PULMONARY DISEASE, UNSPECIFIED COPD TYPE (HCC): ICD-10-CM

## 2021-04-16 PROCEDURE — 99214 OFFICE O/P EST MOD 30 MIN: CPT | Performed by: INTERNAL MEDICINE

## 2021-04-16 NOTE — PROGRESS NOTES
Aimee Gonzalez is a 56year old male.     HPI:   Patient presents with:  Physical: medicare annual visit  Derm Problem: [place] under right eye - wants dermatologist within [place]  Diabetes: had eye exam 5 months ago - need report from [place]      55 y/o drinks    Drug use: No       Medications (Active prior to today's visit):  Current Outpatient Medications   Medication Sig Dispense Refill   • oxyCODONE HCl ER (OXYCONTIN) 30 MG Oral Tablet Extended Release 12 hour Abuse-Deterrent Take 1 tablet by mouth 2 RASH    Comment:ADHESIVE  [place]               PAIN, UNKNOWN    Comment:UNKNOWN             UNKNOWN  Lactose                 UNKNOWN  Lipitor [Atorvastat*    MYALGIA    Comment:April 2016  Pollen Extract          UNKNOWN  Chlorine                RASH 1-Yes    Do you have any tripping hazards?: 0-No    Are you on multiple medications?: 1-Yes    Does pain affect your day to day activities?: 1-Yes     Have you had any memory issues?: 0-No    Fall/Risk [place]          Depression Screening (PHQ-2/PHQ-9) Maintenance if applicable   Immunizations      Influenza No orders found for this or any previous visit. Update Immunization Activity if applicable    Pneumococcal No orders found for this or any previous visit.  Update Immunization Activity if applicable polyphagia  Gastrointestinal:  Negative for abdominal pain, constipation, decreased appetite, diarrhea and vomiting; no melena or hematochezia  Musculoskeletal:  Negative for arthralgias or myalgias  Genitourinary:  Negative for dysuria or polyuria  Hema/L [address] [employer] on 4/12/21 for COPD exacerbation and was given unknown Abx and steroids     Health Maintenance  colonoscopy on 7/16/18 which showed diverticulosis and internal hemorrhoids; had Pneumovax on 11/7/18    Right shoulder labral tear  s/p arthroscopic Jud due to high physician turnover; will continue Rx as outlined above; will order drug abuse panel for narcotic monitoring  -on oxycontin CR to 30 mg po BID, and Norco 10 mg two tabs po TID; if stable in July 2021, plan decrease oxycontin CR 20 mg p screening  PSA 1.3 in June 2018  Venous insufficiency  Takes torsemide 10 mg po qD prn  Prostatism   On Flomax 0.4 mg po qHS; nocturia x5; small urine volumes at night; discussed urology referral--> pt declines for now  Hypercholesterolemia   in Apr

## 2021-05-03 RX ORDER — HYDROCODONE BITARTRATE AND ACETAMINOPHEN 10; 325 MG/1; MG/1
2 TABLET ORAL 3 TIMES DAILY PRN
Qty: 180 TABLET | Refills: 0 | Status: SHIPPED | OUTPATIENT
Start: 2021-05-03 | End: 2021-06-04

## 2021-05-03 RX ORDER — OXYCODONE HYDROCHLORIDE 30 MG/1
TABLET, FILM COATED, EXTENDED RELEASE ORAL
Qty: 60 EACH | Refills: 0 | Status: SHIPPED | OUTPATIENT
Start: 2021-05-03 | End: 2021-05-26

## 2021-05-03 NOTE — TELEPHONE ENCOUNTER
To MD:  The above refill request is for a controlled substance. Please review pended medication order. Print and sign for staff to fax to pharmacy or prescribe electronically.     Last office visit:4/16/2021    Oxycontin  Last time refill sent and quanti

## 2021-05-03 NOTE — TELEPHONE ENCOUNTER
Pt calling for refills . Patient is completely out. Pt needs as soon as possible. Needs to  the  Medication so he can be back in [place] for is wife's  heart surgery.       Any questions you can call him at:  [phone]

## 2021-05-03 NOTE — TELEPHONE ENCOUNTER
Imp- chronic pain syndrome     -ERx sent for oxycontin CR 30 mg po BID, #60,  and Norco 10 mg two tabs po TID, #180; ICD 10 code for pt G89.4;    Pt notified

## 2021-05-03 NOTE — TELEPHONE ENCOUNTER
Patient is calling back to check the status of the below medication refill request. Patient was informed that refills can tae 48-72 hours to get to the pharmacy. Patient verbalized understanding but stated he needs to leave and head out to [facility] soon.

## 2021-05-26 ENCOUNTER — TELEPHONE (OUTPATIENT)
Dept: INTERNAL MEDICINE CLINIC | Facility: CLINIC | Age: 61
End: 2021-05-26

## 2021-05-26 RX ORDER — OXYCODONE HYDROCHLORIDE 30 MG/1
TABLET, FILM COATED, EXTENDED RELEASE ORAL
Qty: 60 TABLET | Refills: 0 | Status: SHIPPED | OUTPATIENT
Start: 2021-06-02 | End: 2021-06-21

## 2021-05-26 NOTE — TELEPHONE ENCOUNTER
Spoke with patient who confirms he needs next refill sent to [facility]. He states he will be good until next week. To MD:  The above refill request is for a controlled substance. Please review pended medication order.    Print and sign for staff to corine

## 2021-05-27 NOTE — TELEPHONE ENCOUNTER
Imp- chronic pain syndrome     -ERx sent for oxycontin CR 30 mg po BID, #60,   ICD 10 code for pt G89.4;

## 2021-06-04 ENCOUNTER — TELEPHONE (OUTPATIENT)
Dept: INTERNAL MEDICINE CLINIC | Facility: CLINIC | Age: 61
End: 2021-06-04

## 2021-06-04 RX ORDER — HYDROCODONE BITARTRATE AND ACETAMINOPHEN 10; 325 MG/1; MG/1
2 TABLET ORAL 3 TIMES DAILY PRN
Qty: 180 TABLET | Refills: 0 | Status: SHIPPED | OUTPATIENT
Start: 2021-06-04 | End: 2021-06-23

## 2021-06-04 NOTE — TELEPHONE ENCOUNTER
To MD:  The above refill request is for a controlled substance. Please review pended medication order. Print and sign for staff to fax to pharmacy or prescribe electronically.     Last office visit: 4/16/2021  Last time refill sent and quantity/refills:

## 2021-06-21 NOTE — TELEPHONE ENCOUNTER
Pt called requests refill for Oxcontin due 7/1   and Norco are sent to [facility]   in [address] [place] [address]

## 2021-06-22 NOTE — TELEPHONE ENCOUNTER
To Dr. Jersey Mason MD:  The above refill request is for a controlled substance. Please review pended medication order. Print and sign for staff to fax to pharmacy or prescribe electronically.     Last office visit: 4/16/21  Last time refill sent an

## 2021-06-23 RX ORDER — OXYCODONE HYDROCHLORIDE 30 MG/1
1 TABLET, FILM COATED, EXTENDED RELEASE ORAL 2 TIMES DAILY
Qty: 60 TABLET | Refills: 0 | Status: SHIPPED | OUTPATIENT
Start: 2021-06-23 | End: 2021-07-22

## 2021-06-23 RX ORDER — HYDROCODONE BITARTRATE AND ACETAMINOPHEN 10; 325 MG/1; MG/1
2 TABLET ORAL 3 TIMES DAILY PRN
Qty: 180 TABLET | Refills: 0 | Status: SHIPPED | OUTPATIENT
Start: 2021-06-23 | End: 2021-07-22

## 2021-06-23 RX ORDER — OXYCODONE HYDROCHLORIDE 30 MG/1
TABLET, FILM COATED, EXTENDED RELEASE ORAL
Qty: 60 TABLET | Refills: 0 | OUTPATIENT
Start: 2021-06-23

## 2021-06-23 RX ORDER — HYDROCODONE BITARTRATE AND ACETAMINOPHEN 10; 325 MG/1; MG/1
TABLET ORAL
Qty: 180 TABLET | Refills: 0 | OUTPATIENT
Start: 2021-06-23

## 2021-06-23 NOTE — TELEPHONE ENCOUNTER
Norco sent 6/4/21 #180  OxyContin sent 6/2/21 #60    Per [place] [facility]  last dispensed 6/5,6/2      Request too early. Current refill request refused due to refill is either a duplicate request or has active refills at the pharmacy. Check previous templates.

## 2021-06-24 NOTE — TELEPHONE ENCOUNTER
Imp- osteoarthritis; refilled oxycontin ER 30 mg po BID, #60 and Norco 10 mg two tabs po TID, #180; ICD 10 code for pt G89.4;     ERx sent

## 2021-07-21 ENCOUNTER — TELEPHONE (OUTPATIENT)
Dept: INTERNAL MEDICINE CLINIC | Facility: CLINIC | Age: 61
End: 2021-07-21

## 2021-07-22 RX ORDER — HYDROCODONE BITARTRATE AND ACETAMINOPHEN 10; 325 MG/1; MG/1
2 TABLET ORAL 3 TIMES DAILY PRN
Qty: 180 TABLET | Refills: 0 | Status: SHIPPED | OUTPATIENT
Start: 2021-08-03 | End: 2021-08-23

## 2021-07-22 RX ORDER — OXYCODONE HYDROCHLORIDE 30 MG/1
1 TABLET, FILM COATED, EXTENDED RELEASE ORAL 2 TIMES DAILY
Qty: 60 TABLET | Refills: 0 | Status: SHIPPED | OUTPATIENT
Start: 2021-07-30 | End: 2021-08-23

## 2021-08-23 ENCOUNTER — TELEPHONE (OUTPATIENT)
Dept: INTERNAL MEDICINE CLINIC | Facility: CLINIC | Age: 61
End: 2021-08-23

## 2021-08-23 RX ORDER — OXYCODONE HYDROCHLORIDE 30 MG/1
1 TABLET, FILM COATED, EXTENDED RELEASE ORAL 2 TIMES DAILY
Qty: 60 TABLET | Refills: 0 | Status: SHIPPED | OUTPATIENT
Start: 2021-08-30 | End: 2021-09-23

## 2021-08-23 RX ORDER — HYDROCODONE BITARTRATE AND ACETAMINOPHEN 10; 325 MG/1; MG/1
2 TABLET ORAL 3 TIMES DAILY PRN
Qty: 180 TABLET | Refills: 0 | Status: SHIPPED | OUTPATIENT
Start: 2021-09-03 | End: 2021-09-23

## 2021-08-23 NOTE — TELEPHONE ENCOUNTER
Patient is calling to request a refill for his Oxycontin and Norco. Patient states he is requesting it now so he gets it in time for his refill due date on 8/30/2021. Please send refills to [employer]  [facility] in [address] [facility] [place], [address]; Patient

## 2021-08-23 NOTE — TELEPHONE ENCOUNTER
To MD:  The above refill request is for a controlled substance. Please review pended medication order. Print and sign for staff to fax to pharmacy or prescribe electronically.     Last office visit: 4/16/2021     Hydrocodone   Last time refill sent and q

## 2021-09-08 ENCOUNTER — OFFICE VISIT (OUTPATIENT)
Dept: INTERNAL MEDICINE CLINIC | Facility: CLINIC | Age: 61
End: 2021-09-08
Payer: MEDICARE

## 2021-09-08 ENCOUNTER — LAB ENCOUNTER (OUTPATIENT)
Dept: LAB | Age: 61
End: 2021-09-08
Attending: INTERNAL MEDICINE
Payer: MEDICARE

## 2021-09-08 VITALS
HEART RATE: 119 BPM | SYSTOLIC BLOOD PRESSURE: 140 MMHG | WEIGHT: 213.38 LBS | OXYGEN SATURATION: 94 % | BODY MASS INDEX: 32.72 KG/M2 | DIASTOLIC BLOOD PRESSURE: 80 MMHG | HEIGHT: 67.8 IN | TEMPERATURE: 98 F

## 2021-09-08 DIAGNOSIS — R94.31 ABNORMAL EKG: ICD-10-CM

## 2021-09-08 DIAGNOSIS — I87.2 VENOUS INSUFFICIENCY: ICD-10-CM

## 2021-09-08 DIAGNOSIS — M19.90 OSTEOARTHRITIS, UNSPECIFIED OSTEOARTHRITIS TYPE, UNSPECIFIED SITE: ICD-10-CM

## 2021-09-08 DIAGNOSIS — Z12.5 SCREENING PSA (PROSTATE SPECIFIC ANTIGEN): ICD-10-CM

## 2021-09-08 DIAGNOSIS — Z20.822 ENCOUNTER FOR PREPROCEDURE SCREENING LABORATORY TESTING FOR COVID-19: ICD-10-CM

## 2021-09-08 DIAGNOSIS — Z79.891 LONG TERM PRESCRIPTION OPIATE USE: ICD-10-CM

## 2021-09-08 DIAGNOSIS — I10 ESSENTIAL HYPERTENSION: ICD-10-CM

## 2021-09-08 DIAGNOSIS — K29.70 GASTRITIS WITHOUT BLEEDING, UNSPECIFIED CHRONICITY, UNSPECIFIED GASTRITIS TYPE: ICD-10-CM

## 2021-09-08 DIAGNOSIS — I73.9 PVD (PERIPHERAL VASCULAR DISEASE) (HCC): ICD-10-CM

## 2021-09-08 DIAGNOSIS — G62.9 PERIPHERAL POLYNEUROPATHY: ICD-10-CM

## 2021-09-08 DIAGNOSIS — E11.9 TYPE 2 DIABETES MELLITUS WITHOUT COMPLICATION, UNSPECIFIED WHETHER LONG TERM INSULIN USE (HCC): ICD-10-CM

## 2021-09-08 DIAGNOSIS — S43.431S LABRAL TEAR OF SHOULDER, RIGHT, SEQUELA: ICD-10-CM

## 2021-09-08 DIAGNOSIS — I71.4 ABDOMINAL AORTIC ANEURYSM (AAA) WITHOUT RUPTURE (HCC): ICD-10-CM

## 2021-09-08 DIAGNOSIS — E78.00 PURE HYPERCHOLESTEROLEMIA: ICD-10-CM

## 2021-09-08 DIAGNOSIS — G89.4 CHRONIC PAIN SYNDROME: ICD-10-CM

## 2021-09-08 DIAGNOSIS — Z00.00 PHYSICAL EXAM, ANNUAL: Primary | ICD-10-CM

## 2021-09-08 DIAGNOSIS — K64.8 INTERNAL HEMORRHOIDS: ICD-10-CM

## 2021-09-08 DIAGNOSIS — G25.81 RLS (RESTLESS LEGS SYNDROME): ICD-10-CM

## 2021-09-08 DIAGNOSIS — J44.9 CHRONIC OBSTRUCTIVE PULMONARY DISEASE, UNSPECIFIED COPD TYPE (HCC): ICD-10-CM

## 2021-09-08 DIAGNOSIS — L98.9 FACIAL LESION: ICD-10-CM

## 2021-09-08 DIAGNOSIS — Z01.812 ENCOUNTER FOR PREPROCEDURE SCREENING LABORATORY TESTING FOR COVID-19: ICD-10-CM

## 2021-09-08 DIAGNOSIS — R04.2 HEMOPTYSIS: ICD-10-CM

## 2021-09-08 DIAGNOSIS — R07.2 PRECORDIAL PAIN: ICD-10-CM

## 2021-09-08 DIAGNOSIS — F17.200 SMOKING: ICD-10-CM

## 2021-09-08 DIAGNOSIS — K76.0 HEPATIC STEATOSIS: ICD-10-CM

## 2021-09-08 LAB
ALBUMIN SERPL-MCNC: 3.3 G/DL (ref 3.4–5)
ALBUMIN/GLOB SERPL: 0.6 {RATIO} (ref 1–2)
ALP LIVER SERPL-CCNC: 145 U/L
ALT SERPL-CCNC: 32 U/L
AMPHET UR QL SCN: NEGATIVE
ANION GAP SERPL CALC-SCNC: 4 MMOL/L (ref 0–18)
AST SERPL-CCNC: 24 U/L (ref 15–37)
BARBITURATES UR QL SCN: NEGATIVE
BASOPHILS # BLD AUTO: 0.09 X10(3) UL (ref 0–0.2)
BASOPHILS NFR BLD AUTO: 0.7 %
BILIRUB SERPL-MCNC: 0.4 MG/DL (ref 0.1–2)
BUN BLD-MCNC: 14 MG/DL (ref 7–18)
BUN/CREAT SERPL: 12.2 (ref 10–20)
CALCIUM BLD-MCNC: 9.5 MG/DL (ref 8.5–10.1)
CANNABINOIDS UR QL SCN: NEGATIVE
CHLORIDE SERPL-SCNC: 107 MMOL/L (ref 98–112)
CHOLEST SMN-MCNC: 134 MG/DL (ref ?–200)
CO2 SERPL-SCNC: 31 MMOL/L (ref 21–32)
COCAINE UR QL: NEGATIVE
COMPLEXED PSA SERPL-MCNC: 2.49 NG/ML (ref ?–4)
CREAT BLD-MCNC: 1.15 MG/DL
CREAT UR-SCNC: 277 MG/DL
DEPRECATED RDW RBC AUTO: 44.9 FL (ref 35.1–46.3)
EOSINOPHIL # BLD AUTO: 0.4 X10(3) UL (ref 0–0.7)
EOSINOPHIL NFR BLD AUTO: 3.3 %
ERYTHROCYTE [DISTWIDTH] IN BLOOD BY AUTOMATED COUNT: 13.2 % (ref 11–15)
EST. AVERAGE GLUCOSE BLD GHB EST-MCNC: 146 MG/DL (ref 68–126)
GLOBULIN PLAS-MCNC: 5.3 G/DL (ref 2.8–4.4)
GLUCOSE BLD-MCNC: 92 MG/DL (ref 70–99)
HBA1C MFR BLD HPLC: 6.7 % (ref ?–5.7)
HCT VFR BLD AUTO: 45.3 %
HDLC SERPL-MCNC: 23 MG/DL (ref 40–59)
HGB BLD-MCNC: 14.3 G/DL
IMM GRANULOCYTES # BLD AUTO: 0.05 X10(3) UL (ref 0–1)
IMM GRANULOCYTES NFR BLD: 0.4 %
LDLC SERPL CALC-MCNC: 75 MG/DL (ref ?–100)
LYMPHOCYTES # BLD AUTO: 3.48 X10(3) UL (ref 1–4)
LYMPHOCYTES NFR BLD AUTO: 28.8 %
M PROTEIN MFR SERPL ELPH: 8.6 G/DL (ref 6.4–8.2)
MCH RBC QN AUTO: 29.2 PG (ref 26–34)
MCHC RBC AUTO-ENTMCNC: 31.6 G/DL (ref 31–37)
MCV RBC AUTO: 92.6 FL
MDMA UR QL SCN: NEGATIVE
METHADONE UR QL SCN: NEGATIVE
MONOCYTES # BLD AUTO: 0.94 X10(3) UL (ref 0.1–1)
MONOCYTES NFR BLD AUTO: 7.8 %
NEUTROPHILS # BLD AUTO: 7.13 X10 (3) UL (ref 1.5–7.7)
NEUTROPHILS # BLD AUTO: 7.13 X10(3) UL (ref 1.5–7.7)
NEUTROPHILS NFR BLD AUTO: 59 %
NONHDLC SERPL-MCNC: 111 MG/DL (ref ?–130)
OSMOLALITY SERPL CALC.SUM OF ELEC: 294 MOSM/KG (ref 275–295)
PATIENT FASTING Y/N/NP: NO
PATIENT FASTING Y/N/NP: NO
PCP UR QL SCN: NEGATIVE
PLATELET # BLD AUTO: 296 10(3)UL (ref 150–450)
POTASSIUM SERPL-SCNC: 4 MMOL/L (ref 3.5–5.1)
RBC # BLD AUTO: 4.89 X10(6)UL
SODIUM SERPL-SCNC: 142 MMOL/L (ref 136–145)
TRIGL SERPL-MCNC: 213 MG/DL (ref 30–149)
TSI SER-ACNC: 1.78 MIU/ML (ref 0.36–3.74)
VLDLC SERPL CALC-MCNC: 33 MG/DL (ref 0–30)
WBC # BLD AUTO: 12.1 X10(3) UL (ref 4–11)

## 2021-09-08 PROCEDURE — G0439 PPPS, SUBSEQ VISIT: HCPCS | Performed by: INTERNAL MEDICINE

## 2021-09-08 PROCEDURE — 36415 COLL VENOUS BLD VENIPUNCTURE: CPT

## 2021-09-08 PROCEDURE — 80053 COMPREHEN METABOLIC PANEL: CPT

## 2021-09-08 PROCEDURE — 99214 OFFICE O/P EST MOD 30 MIN: CPT | Performed by: INTERNAL MEDICINE

## 2021-09-08 PROCEDURE — 93000 ELECTROCARDIOGRAM COMPLETE: CPT | Performed by: INTERNAL MEDICINE

## 2021-09-08 PROCEDURE — 85025 COMPLETE CBC W/AUTO DIFF WBC: CPT

## 2021-09-08 PROCEDURE — 80346 BENZODIAZEPINES1-12: CPT

## 2021-09-08 PROCEDURE — 84443 ASSAY THYROID STIM HORMONE: CPT

## 2021-09-08 PROCEDURE — 83036 HEMOGLOBIN GLYCOSYLATED A1C: CPT

## 2021-09-08 PROCEDURE — 80061 LIPID PANEL: CPT

## 2021-09-08 PROCEDURE — 80361 OPIATES 1 OR MORE: CPT

## 2021-09-08 PROCEDURE — 80307 DRUG TEST PRSMV CHEM ANLYZR: CPT

## 2021-09-08 RX ORDER — IPRATROPIUM BROMIDE AND ALBUTEROL SULFATE 2.5; .5 MG/3ML; MG/3ML
3 SOLUTION RESPIRATORY (INHALATION)
Refills: 0 | COMMUNITY
Start: 2021-09-08

## 2021-09-08 RX ORDER — BUSPIRONE HYDROCHLORIDE 5 MG/1
5 TABLET ORAL 2 TIMES DAILY
COMMUNITY

## 2021-09-08 RX ORDER — TORSEMIDE 20 MG/1
20 TABLET ORAL DAILY
Refills: 0 | COMMUNITY
Start: 2021-09-08

## 2021-09-08 NOTE — PROGRESS NOTES
Lainey Galo is a 56year old male.     HPI:   Patient presents with:  Physical: medicare annual wellness visit , will get new artery -had pre-op done      55 y/o M here for subsequent Medicare annual wellness exam; has PVD with claudication and has b Packs/day: 0.33        Years: 30.00        Pack years: 9.9      Smokeless tobacco: Never Used    Vaping Use      Vaping Use: Former    Alcohol use: No      Alcohol/week: 0.0 standard drinks    Drug use: No       Medications (Active prior to today's visit) reaction(s): GI upset  Adhesive Tape           RASH    Comment:ADHESIVE  Lac Bovis               PAIN, UNKNOWN    Comment:UNKNOWN             UNKNOWN  Lactose                 UNKNOWN  Lipitor [Atorvastat*    MYALGIA    Comment:April 2016  Pollen Extract getting up?: 1-Yes    Do you have any tripping hazards?: 0-No    Are you on multiple medications?: 1-Yes    Does pain affect your day to day activities?: 1-Yes     Have you had any memory issues?: 0-No    Fall/Risk Scorin          Depression Screening Update Health Maintenance if applicable   Immunizations      Influenza No orders found for this or any previous visit. Update Immunization Activity if applicable    Pneumococcal No orders found for this or any previous visit.  Update Immunization Activity i polyphagia  Gastrointestinal:  Negative for abdominal pain, constipation, decreased appetite, diarrhea and vomiting; no melena or hematochezia  Musculoskeletal:  Negative for arthralgias or myalgias  Genitourinary:  Negative for dysuria or polyuria  Hema/L Duoneb QID prn, and Serevent diskus one puff BID (though not using), and albuterol HFA 2 p QID; [facility] on 4[facility]12[facility]21 for COPD exacerbation and was given unknown Abx and steroids  -on montelukast 10 mg po daily, albuterol HFA 2 p QID, and Symbicort of narcotic pain regimen in the past; current regimen has improved analgesia superior to alternate regimens, improved ADLs, and he has no adverse effects of regimen, and he shows no signs of symptoms of addiction or addiction behavior; pt does not seek MD vascular targets for possible RLE bypass pending cardiac clearance; had open wound to heel that healed after prolonged period  -no clearance determination yet until all studies completed  Hypertension  on lisinopril 30 mg po qD; pt states he had cardiac st Metabolic Panel (14)      Lipid Panel      Assay, Thyroid Stim Hormone      PSA Screen      Hemoglobin A1C      Pre-Procedure Covid-19 Testing by PCR [place])      Meds This Visit:  Requested Prescriptions      No prescriptions requested or ordered in thi

## 2021-09-09 ENCOUNTER — APPOINTMENT (OUTPATIENT)
Dept: URBAN - METROPOLITAN AREA CLINIC 321 | Age: 61
Setting detail: DERMATOLOGY
End: 2021-09-10

## 2021-09-09 DIAGNOSIS — L82.1 OTHER SEBORRHEIC KERATOSIS: ICD-10-CM

## 2021-09-09 DIAGNOSIS — D22 MELANOCYTIC NEVI: ICD-10-CM

## 2021-09-09 DIAGNOSIS — L81.4 OTHER MELANIN HYPERPIGMENTATION: ICD-10-CM

## 2021-09-09 DIAGNOSIS — Z85.820 PERSONAL HISTORY OF MALIGNANT MELANOMA OF SKIN: ICD-10-CM

## 2021-09-09 PROBLEM — D22.5 MELANOCYTIC NEVI OF TRUNK: Status: ACTIVE | Noted: 2021-09-09

## 2021-09-09 PROBLEM — D22.62 MELANOCYTIC NEVI OF LEFT UPPER LIMB, INCLUDING SHOULDER: Status: ACTIVE | Noted: 2021-09-09

## 2021-09-09 PROBLEM — C44.319 BASAL CELL CARCINOMA OF SKIN OF OTHER PARTS OF FACE: Status: ACTIVE | Noted: 2021-09-09

## 2021-09-09 PROBLEM — D22.61 MELANOCYTIC NEVI OF RIGHT UPPER LIMB, INCLUDING SHOULDER: Status: ACTIVE | Noted: 2021-09-09

## 2021-09-09 PROCEDURE — OTHER COUNSELING: OTHER

## 2021-09-09 PROCEDURE — 99203 OFFICE O/P NEW LOW 30 MIN: CPT | Mod: 25

## 2021-09-09 PROCEDURE — 11104 PUNCH BX SKIN SINGLE LESION: CPT

## 2021-09-09 PROCEDURE — OTHER BIOPSY BY PUNCH METHOD: OTHER

## 2021-09-09 ASSESSMENT — LOCATION SIMPLE DESCRIPTION DERM
LOCATION SIMPLE: LEFT FOREARM
LOCATION SIMPLE: LEFT UPPER ARM
LOCATION SIMPLE: RIGHT CHEEK
LOCATION SIMPLE: CHEST
LOCATION SIMPLE: RIGHT UPPER ARM

## 2021-09-09 ASSESSMENT — LOCATION DETAILED DESCRIPTION DERM
LOCATION DETAILED: RIGHT SUPERIOR MEDIAL MALAR CHEEK
LOCATION DETAILED: UPPER STERNUM
LOCATION DETAILED: LEFT ANTERIOR DISTAL UPPER ARM
LOCATION DETAILED: RIGHT ANTERIOR DISTAL UPPER ARM
LOCATION DETAILED: LEFT VENTRAL PROXIMAL FOREARM
LOCATION DETAILED: LEFT ANTECUBITAL SKIN
LOCATION DETAILED: MIDDLE STERNUM
LOCATION DETAILED: RIGHT ANTECUBITAL SKIN
LOCATION DETAILED: RIGHT MEDIAL SUPERIOR CHEST

## 2021-09-09 ASSESSMENT — LOCATION ZONE DERM
LOCATION ZONE: TRUNK
LOCATION ZONE: ARM
LOCATION ZONE: FACE

## 2021-09-09 NOTE — PROCEDURE: BIOPSY BY PUNCH METHOD
Bill 58128 For Specimen Handling/Conveyance To Laboratory?: no
Validate Note Data (See Information Below): Yes
Anesthesia Volume In Cc (Will Not Render If 0): 0.5
Epidermal Sutures: 6-0 Nylon
Biopsy Type: H and E
Detail Level: Detailed
Information: Selecting Yes will display possible errors in your note based on the variables you have selected. This validation is only offered as a suggestion for you. PLEASE NOTE THAT THE VALIDATION TEXT WILL BE REMOVED WHEN YOU FINALIZE YOUR NOTE. IF YOU WANT TO FAX A PRELIMINARY NOTE YOU WILL NEED TO TOGGLE THIS TO 'NO' IF YOU DO NOT WANT IT IN YOUR FAXED NOTE.
Consent: Written consent was obtained and risks were reviewed including but not limited to scarring, infection, bleeding, scabbing, incomplete removal, nerve damage and allergy to anesthesia.
X Depth Of Punch In Cm (Optional): 0
Dressing: bandage
Post-Care Instructions: I reviewed with the patient in detail post-care instructions. Patient is to keep the biopsy site dry overnight, and then apply petrolatum twice daily until healed. Patient may apply hydrogen peroxide soaks to remove any crusting.
Punch Size In Mm: 3
Suture Removal: 7 days
Billing Type: Third-Party Bill
Wound Care: Petrolatum
Home Suture Removal Text: Patient will remove their sutures at home.  If they have any questions or difficulties they will call the office.
Notification Instructions: Patient will be notified of biopsy results. However, patient instructed to call the office if not contacted within 2 weeks.
Anesthesia Type: 1% lidocaine with epinephrine
Hemostasis: None

## 2021-09-11 LAB
7-AMINOCLONAZEPAM, URINE: <5 NG/ML
ALPHA-HYDROXYALPRAZOLAM, URINE: 155 NG/ML
ALPHA-HYDROXYMIDAZOLAM, URINE: <20 NG/ML
ALPRAZOLAM, URINE: 310 NG/ML
CHLORDIAZEPOXIDE, URINE: <20 NG/ML
CLONAZEPAM, URINE: <5 NG/ML
DIAZEPAM, URINE: <20 NG/ML
LORAZEPAM, URINE: <20 NG/ML
MIDAZOLAM, URINE: <20 NG/ML
NORDIAZEPAM, URINE: 29 NG/ML
OPIATES, UR, 6-ACETYLMORPHINE: <10 NG/ML
OPIATES, URINE, CODEINE: 1128 NG/ML
OPIATES, URINE, HYDROCODONE: >4000 NG/ML
OPIATES, URINE, HYDROMORPHONE: 473 NG/ML
OPIATES, URINE, MORPHINE: 111 NG/ML
OPIATES, URINE, NORHYDROCODONE: >4000 NG/ML
OPIATES, URINE, NOROXYCODONE: >4000 NG/ML
OPIATES, URINE, NOROXYMORPHONE: >1000 NG/ML
OPIATES, URINE, OXYCODONE: >4000 NG/ML
OPIATES, URINE, OXYMORPHONE: 360 NG/ML
OXAZEPAM, URINE: 71 NG/ML
TEMAZEPAM, URINE: 30 NG/ML

## 2021-09-23 ENCOUNTER — TELEPHONE (OUTPATIENT)
Dept: INTERNAL MEDICINE CLINIC | Facility: CLINIC | Age: 61
End: 2021-09-23

## 2021-09-23 RX ORDER — HYDROCODONE BITARTRATE AND ACETAMINOPHEN 10; 325 MG/1; MG/1
2 TABLET ORAL 3 TIMES DAILY PRN
Qty: 180 TABLET | Refills: 0 | Status: SHIPPED | OUTPATIENT
Start: 2021-10-03 | End: 2021-10-19

## 2021-09-23 RX ORDER — OXYCODONE HYDROCHLORIDE 30 MG/1
1 TABLET, FILM COATED, EXTENDED RELEASE ORAL 2 TIMES DAILY
Qty: 60 TABLET | Refills: 0 | Status: SHIPPED | OUTPATIENT
Start: 2021-09-30 | End: 2021-10-19

## 2021-09-23 NOTE — TELEPHONE ENCOUNTER
Bryan العلي on 9/9/21. He was diagnosed with Melanoma and will be having surgery. FYI to Dr. Lily Salter.

## 2021-09-23 NOTE — TELEPHONE ENCOUNTER
Pt. Called to have Dr. Hali Bear refill his Oxycodone 30 mgs and his Norco 10/325 mgs. Please send to [facility]. He said the Oxy is due on 9/30/21 and the David Wilburton is due on 10/3/21.

## 2021-09-23 NOTE — TELEPHONE ENCOUNTER
To MD:  The above refill request is for a controlled substance. Please review pended medication order. Print and sign for staff to fax to pharmacy or prescribe electronically.     Last office visit: 9/8/2021     Oxycontin   Last time refill sent and [facility]

## 2021-10-19 ENCOUNTER — TELEPHONE (OUTPATIENT)
Dept: INTERNAL MEDICINE CLINIC | Facility: CLINIC | Age: 61
End: 2021-10-19

## 2021-10-19 RX ORDER — OXYCODONE HYDROCHLORIDE 30 MG/1
TABLET ORAL
Qty: 60 TABLET | Refills: 0 | OUTPATIENT
Start: 2021-10-19

## 2021-10-19 RX ORDER — HYDROCODONE BITARTRATE AND ACETAMINOPHEN 10; 325 MG/1; MG/1
2 TABLET ORAL 3 TIMES DAILY PRN
Qty: 180 TABLET | Refills: 0 | OUTPATIENT
Start: 2021-10-19

## 2021-10-19 NOTE — TELEPHONE ENCOUNTER
Current refill request refused due to refill is either a duplicate request or has active refills at the pharmacy. Check previous templates.     Requested Prescriptions     Refused Prescriptions Disp Refills   • HYDROCODONE-ACETAMINOPHEN  MG Oral Tab

## 2021-10-19 NOTE — TELEPHONE ENCOUNTER
Pt. Is having some melanoma surgery on 11/4/21. His Dermotologist suggested he stop his Plavix 1 week before procedure. Pt. Not sure if he should stop it for that long. He states when he stops taking it his legs hurt really bad.   So, he is asking if dae

## 2021-10-19 NOTE — TELEPHONE ENCOUNTER
Stopping Plavix for 4 days is probably adequate to minimize bleeding, and minimize the time off the medication, traditionally it is 7 days as the recommendation to stop Plavix before any procedures.   He should realize it is not up to me/it is up to the leslie

## 2021-10-19 NOTE — TELEPHONE ENCOUNTER
To MD:  The above refill request is for a controlled substance. Please review pended medication order. Print and sign for staff to fax to pharmacy or prescribe electronically.     Last office visit: 9/8/21  Last time refill sent and quantity/refills: oxy

## 2021-10-19 NOTE — TELEPHONE ENCOUNTER
To Dr. Shara Villanueva - see below. Pt has 1 stent in each leg and 1 stent in stomach due to poor circulation. When plavix stopped pain is severe.

## 2021-10-19 NOTE — TELEPHONE ENCOUNTER
Pt. Called stating he is due to get a refill on Oxycontin on 10/30/21 and his [place] refill on 11/3/21. The pt. Is having surgery on 11/4/21 and he would like to get these filled before his surgery. Pt. Can be reached at [phone].

## 2021-10-19 NOTE — TELEPHONE ENCOUNTER
To Ming Valente-- can you please further assist with this? I wasn't sure from message below if 4 or 7 days was ok/advised. Noted procedure is 11/4, OK to wait for Cynthia's return on Thursday.

## 2021-10-21 RX ORDER — OXYCODONE HYDROCHLORIDE 30 MG/1
1 TABLET, FILM COATED, EXTENDED RELEASE ORAL 2 TIMES DAILY
Qty: 60 TABLET | Refills: 0 | Status: SHIPPED | OUTPATIENT
Start: 2021-10-30 | End: 2021-11-22

## 2021-10-21 RX ORDER — HYDROCODONE BITARTRATE AND ACETAMINOPHEN 10; 325 MG/1; MG/1
2 TABLET ORAL 3 TIMES DAILY PRN
Qty: 180 TABLET | Refills: 0 | Status: SHIPPED | OUTPATIENT
Start: 2021-11-03 | End: 2021-11-22

## 2021-10-21 NOTE — TELEPHONE ENCOUNTER
Left entirety of MD message on VM;  Reiterated for pt 4 day hold is appropriate but will need to be cleared by the dermatologist

## 2021-10-25 ENCOUNTER — TELEPHONE (OUTPATIENT)
Dept: INTERNAL MEDICINE CLINIC | Facility: CLINIC | Age: 61
End: 2021-10-25

## 2021-10-25 NOTE — TELEPHONE ENCOUNTER
To Dr. Jay Caraballo to please advise--------  Pt reports going to ER ([facility], [address]) on Friday 10/22 with complaints of abdominal pain. Pt was dx with diverticulitis. Pt reports current pain level 5/10.  Pt state he was instructed by ER physician to

## 2021-10-25 NOTE — TELEPHONE ENCOUNTER
Earliest fill date on oxycontin ER 30 mg po BID is 10/30/21    I would NOT advise pt taking 30 mg po q6hrs as stated in message; risk of overdose with higher frequency    I am unable to prescribe narcotics earlier than date allowed; if pain intractable, th

## 2021-10-25 NOTE — TELEPHONE ENCOUNTER
Spoke to patient and relayed MD message and instructions, patient verbalizes understanding and agrees with plan. Patient is very understanding of message below. He confirms as in TE 10/19 he will hold his plavix 4 days before his derm procedure.  He denies

## 2021-10-25 NOTE — TELEPHONE ENCOUNTER
Pt. Called stating he was seen in the ED in [place]. He was diagnosed with Diverticulitis. This is a new problem for him. Because he was having this issue, he is now out of Oxycontin, and is asking for another refill.   That seems to be helping his pain

## 2021-11-04 ENCOUNTER — APPOINTMENT (OUTPATIENT)
Dept: URBAN - METROPOLITAN AREA CLINIC 321 | Age: 61
Setting detail: DERMATOLOGY
End: 2021-11-05

## 2021-11-04 PROBLEM — C44.319 BASAL CELL CARCINOMA OF SKIN OF OTHER PARTS OF FACE: Status: ACTIVE | Noted: 2021-11-04

## 2021-11-04 PROCEDURE — 13132 CMPLX RPR F/C/C/M/N/AX/G/H/F: CPT

## 2021-11-04 PROCEDURE — 17311 MOHS 1 STAGE H/N/HF/G: CPT

## 2021-11-04 PROCEDURE — OTHER MOHS SURGERY: OTHER

## 2021-11-22 ENCOUNTER — TELEPHONE (OUTPATIENT)
Dept: INTERNAL MEDICINE CLINIC | Facility: CLINIC | Age: 61
End: 2021-11-22

## 2021-11-22 RX ORDER — HYDROCODONE BITARTRATE AND ACETAMINOPHEN 10; 325 MG/1; MG/1
2 TABLET ORAL 3 TIMES DAILY PRN
Qty: 180 TABLET | Refills: 0 | Status: SHIPPED | OUTPATIENT
Start: 2021-12-02 | End: 2021-12-22

## 2021-11-22 RX ORDER — PANTOPRAZOLE SODIUM 40 MG/1
40 TABLET, DELAYED RELEASE ORAL
Qty: 90 TABLET | Refills: 3 | Status: SHIPPED | OUTPATIENT
Start: 2021-11-22

## 2021-11-22 RX ORDER — OXYCODONE HYDROCHLORIDE 30 MG/1
TABLET, FILM COATED, EXTENDED RELEASE ORAL
Qty: 60 TABLET | Refills: 0 | Status: SHIPPED | OUTPATIENT
Start: 2021-11-29 | End: 2021-12-22

## 2021-11-22 RX ORDER — HYDROCODONE BITARTRATE AND ACETAMINOPHEN 10; 325 MG/1; MG/1
2 TABLET ORAL 3 TIMES DAILY PRN
Qty: 180 TABLET | Refills: 0 | Status: CANCELLED | OUTPATIENT
Start: 2021-11-22

## 2021-11-22 NOTE — TELEPHONE ENCOUNTER
To Dr. Lazarus Heys to please advise on RX.  Never sent in by our office before (only added historically on 6/24/19)

## 2021-11-22 NOTE — TELEPHONE ENCOUNTER
To MD:  The above refill request is for a controlled substance. Please review pended medication order. Print and sign for staff to fax to pharmacy or prescribe electronically. Last office visit: 9/8/21  Last time refill sent and quantity/refills:   Ox

## 2021-12-20 ENCOUNTER — TELEPHONE (OUTPATIENT)
Dept: INTERNAL MEDICINE CLINIC | Facility: CLINIC | Age: 61
End: 2021-12-20

## 2021-12-20 NOTE — TELEPHONE ENCOUNTER
Pt. Called for his refill on Oxycontin and Norco.  Please send to [employer] [facility] in [place]. He is calling a little early due to the [address].

## 2021-12-22 RX ORDER — OXYCODONE HYDROCHLORIDE 30 MG/1
1 TABLET, FILM COATED, EXTENDED RELEASE ORAL 2 TIMES DAILY
Qty: 60 TABLET | Refills: 0 | Status: SHIPPED | OUTPATIENT
Start: 2021-12-22 | End: 2022-01-20

## 2021-12-22 RX ORDER — HYDROCODONE BITARTRATE AND ACETAMINOPHEN 10; 325 MG/1; MG/1
2 TABLET ORAL 3 TIMES DAILY PRN
Qty: 180 TABLET | Refills: 0 | Status: SHIPPED | OUTPATIENT
Start: 2021-12-22 | End: 2022-01-20

## 2021-12-22 NOTE — TELEPHONE ENCOUNTER
To MD:  The above refill request is for a controlled substance. Please review pended medication order. Print and sign for staff to fax to pharmacy or prescribe electronically.     Last office visit: 9/8/21  Last time refill sent and quantity/refills:  Hy

## 2022-01-17 ENCOUNTER — TELEPHONE (OUTPATIENT)
Dept: INTERNAL MEDICINE CLINIC | Facility: CLINIC | Age: 62
End: 2022-01-17

## 2022-01-20 RX ORDER — HYDROCODONE BITARTRATE AND ACETAMINOPHEN 10; 325 MG/1; MG/1
2 TABLET ORAL 3 TIMES DAILY PRN
Qty: 180 TABLET | Refills: 0 | Status: SHIPPED | OUTPATIENT
Start: 2022-01-20

## 2022-01-20 RX ORDER — OXYCODONE HYDROCHLORIDE 30 MG/1
1 TABLET, FILM COATED, EXTENDED RELEASE ORAL 2 TIMES DAILY
Qty: 60 TABLET | Refills: 0 | Status: SHIPPED | OUTPATIENT
Start: 2022-01-20

## 2022-02-21 RX ORDER — HYDROCODONE BITARTRATE AND ACETAMINOPHEN 10; 325 MG/1; MG/1
2 TABLET ORAL 3 TIMES DAILY PRN
Qty: 180 TABLET | Refills: 0 | Status: CANCELLED | OUTPATIENT
Start: 2022-02-21

## 2022-02-21 RX ORDER — OXYCODONE HYDROCHLORIDE 30 MG/1
1 TABLET, FILM COATED, EXTENDED RELEASE ORAL 2 TIMES DAILY
Qty: 60 TABLET | Refills: 0 | Status: CANCELLED | OUTPATIENT
Start: 2022-02-21

## 2022-02-22 NOTE — TELEPHONE ENCOUNTER
To MD:  The above refill request is for a controlled substance. Please review pended medication order. Print and sign for staff to fax to pharmacy or prescribe electronically.     To Dr.O Elizabeth kuamr indicate if pt is due for OV as there are none scheduled  Last office visit:   9/2021  Last time refill sent and quantity/refills:  1/29/22

## 2022-02-23 RX ORDER — HYDROCODONE BITARTRATE AND ACETAMINOPHEN 10; 325 MG/1; MG/1
2 TABLET ORAL 3 TIMES DAILY PRN
Qty: 180 TABLET | Refills: 0 | Status: SHIPPED | OUTPATIENT
Start: 2022-02-23 | End: 2022-03-18

## 2022-02-23 RX ORDER — OXYCODONE HYDROCHLORIDE 30 MG/1
TABLET, FILM COATED, EXTENDED RELEASE ORAL
Qty: 60 TABLET | Refills: 0 | Status: SHIPPED | OUTPATIENT
Start: 2022-02-23 | End: 2022-03-18

## 2022-03-17 ENCOUNTER — TELEPHONE (OUTPATIENT)
Dept: INTERNAL MEDICINE CLINIC | Facility: CLINIC | Age: 62
End: 2022-03-17

## 2022-03-18 RX ORDER — HYDROCODONE BITARTRATE AND ACETAMINOPHEN 10; 325 MG/1; MG/1
2 TABLET ORAL 3 TIMES DAILY PRN
Qty: 180 TABLET | Refills: 0 | Status: SHIPPED | OUTPATIENT
Start: 2022-03-18

## 2022-03-18 RX ORDER — OXYCODONE HYDROCHLORIDE 30 MG/1
1 TABLET, FILM COATED, EXTENDED RELEASE ORAL 2 TIMES DAILY
Qty: 60 TABLET | Refills: 0 | Status: SHIPPED | OUTPATIENT
Start: 2022-03-18

## 2022-03-18 NOTE — TELEPHONE ENCOUNTER
To MD:  The above refill request is for a controlled substance. Please review pended medication order. Print and sign for staff to fax to pharmacy or prescribe electronically.     Last office visit:9/8/21  Last time refill sent and quantity/refills:  Oxycontin last refilled 2/23/22 #60/0  Norco last refilled 2/23/22 #180/0

## 2022-04-11 ENCOUNTER — TELEPHONE (OUTPATIENT)
Dept: INTERNAL MEDICINE CLINIC | Facility: CLINIC | Age: 62
End: 2022-04-11

## 2022-04-11 NOTE — TELEPHONE ENCOUNTER
Patient is calling to request a refill on his Norco and oxyCODONE. Patient state he is due for a refill on 4/24/2022, with Easter around that time, he wanted to request sooner rather than later.      Please send to [facility] in [address] [facility], [address]

## 2022-04-14 NOTE — TELEPHONE ENCOUNTER
Dr Sharon Briceno,   I  checked both scripts were written on 3/18/2022 and both were dispensed on 3/24/2022  Please advise on refills/  Lat OV was 9/8/2021

## 2022-04-18 ENCOUNTER — TELEPHONE (OUTPATIENT)
Dept: INTERNAL MEDICINE CLINIC | Facility: CLINIC | Age: 62
End: 2022-04-18

## 2022-04-18 RX ORDER — HYDROCODONE BITARTRATE AND ACETAMINOPHEN 10; 325 MG/1; MG/1
2 TABLET ORAL 3 TIMES DAILY PRN
Qty: 180 TABLET | Refills: 0 | Status: SHIPPED | OUTPATIENT
Start: 2022-04-18 | End: 2022-04-18

## 2022-04-18 RX ORDER — OXYCODONE HYDROCHLORIDE 30 MG/1
1 TABLET, FILM COATED, EXTENDED RELEASE ORAL 2 TIMES DAILY
Qty: 60 TABLET | Refills: 0 | Status: SHIPPED | OUTPATIENT
Start: 2022-04-18

## 2022-04-18 RX ORDER — OXYCODONE HYDROCHLORIDE 30 MG/1
1 TABLET, FILM COATED, EXTENDED RELEASE ORAL 2 TIMES DAILY
Qty: 60 TABLET | Refills: 0 | Status: SHIPPED | OUTPATIENT
Start: 2022-04-18 | End: 2022-04-18

## 2022-04-18 RX ORDER — HYDROCODONE BITARTRATE AND ACETAMINOPHEN 10; 325 MG/1; MG/1
2 TABLET ORAL 3 TIMES DAILY PRN
Qty: 180 TABLET | Refills: 0 | Status: SHIPPED | OUTPATIENT
Start: 2022-04-18

## 2022-04-18 NOTE — TELEPHONE ENCOUNTER
To MD:  The above refill request is for a controlled substance. Please review pended medication order. Print and sign for staff to fax to pharmacy or prescribe electronically. Last office visit: 9/8/21  Last time refill sent and quantity/refills:  Norco 3/18/22 #180  Oxycontin 3/18/22 #60    Per [place]  both dispensed 3/24/22    TO Dr. Jodi Rand to please advise-- please see note from pharmacy in pending scripts. Patient is wanting to  4/21/22, they would need an OK for early fill since last rxs were picked up 3/24/22.

## 2022-04-21 RX ORDER — OXYCODONE HYDROCHLORIDE 30 MG/1
1 TABLET, FILM COATED, EXTENDED RELEASE ORAL 2 TIMES DAILY
Qty: 60 TABLET | Refills: 0 | OUTPATIENT
Start: 2022-04-21

## 2022-04-21 RX ORDER — HYDROCODONE BITARTRATE AND ACETAMINOPHEN 10; 325 MG/1; MG/1
2 TABLET ORAL 3 TIMES DAILY PRN
Qty: 180 TABLET | Refills: 0 | OUTPATIENT
Start: 2022-04-21

## 2022-05-12 RX ORDER — OXYCODONE HYDROCHLORIDE 30 MG/1
1 TABLET, FILM COATED, EXTENDED RELEASE ORAL 2 TIMES DAILY
Qty: 60 TABLET | Refills: 0 | Status: CANCELLED | OUTPATIENT
Start: 2022-05-12

## 2022-05-12 NOTE — TELEPHONE ENCOUNTER
Pt called to request refills of   Hydrocodone & Oxycodone   Refills are due on 5/22   Please send to [facility] in [address] [place] [address]

## 2022-05-12 NOTE — TELEPHONE ENCOUNTER
To MD:  The above refill request is for a controlled substance. Please review pended medication order. Print and sign for staff to fax to pharmacy or prescribe electronically.     Last office visit: 9/8/21  Last time refill sent and quantity/refills:    Hydrocodone 4/18/22 qty 180 plus 0 refills  Oxycodone 4/18/22 qty 60 plus 0    Per [place]  both were picked up 4/21/22, s/w pt - he states he is aware these are too soon but \"he always requests an early refill and then pharmacy dispenses it when he's due\"    Please advise

## 2022-05-13 RX ORDER — OXYCODONE HCL 20 MG/1
20 TABLET, FILM COATED, EXTENDED RELEASE ORAL EVERY 12 HOURS
Qty: 60 TABLET | Refills: 0 | Status: SHIPPED | OUTPATIENT
Start: 2022-05-13

## 2022-05-13 RX ORDER — HYDROCODONE BITARTRATE AND ACETAMINOPHEN 10; 325 MG/1; MG/1
2 TABLET ORAL 3 TIMES DAILY PRN
Qty: 180 TABLET | Refills: 0 | Status: SHIPPED | OUTPATIENT
Start: 2022-05-13

## 2022-06-14 NOTE — TELEPHONE ENCOUNTER
To MD:  The above refill request is for a controlled substance. Please review pended medication order. Print and sign for staff to fax to pharmacy or prescribe electronically.     Last office visit: 9/8/2021   Oxycontin   Last time refill sent and quantity/refills: 5/13/2022 #60/0       Hydrocodone   Last time refilled - 5/13/2022 # 180/0

## 2022-06-15 RX ORDER — HYDROCODONE BITARTRATE AND ACETAMINOPHEN 10; 325 MG/1; MG/1
TABLET ORAL
Qty: 180 TABLET | Refills: 0 | Status: SHIPPED | OUTPATIENT
Start: 2022-06-15 | End: 2022-06-16

## 2022-06-16 RX ORDER — HYDROCODONE BITARTRATE AND ACETAMINOPHEN 10; 325 MG/1; MG/1
2 TABLET ORAL 3 TIMES DAILY PRN
Qty: 180 TABLET | Refills: 0 | Status: SHIPPED | OUTPATIENT
Start: 2022-06-16

## 2022-06-16 RX ORDER — OXYCODONE HCL 20 MG/1
20 TABLET, FILM COATED, EXTENDED RELEASE ORAL EVERY 12 HOURS
Qty: 60 TABLET | Refills: 0 | Status: SHIPPED | OUTPATIENT
Start: 2022-06-16

## 2022-06-16 NOTE — TELEPHONE ENCOUNTER
Pt called for status on refills  Pt is leaving for [place] tomorrow, early in the morning, needs to take with him  Please call to advise  Tasked to nursing

## 2022-06-16 NOTE — TELEPHONE ENCOUNTER
Patient calling checking on status of refill.   Where patient is the pharmacy closes at [address] [facility] and patient is leaving tomorrow before pharmacy opens

## 2022-07-05 NOTE — TELEPHONE ENCOUNTER
Pt calling checking on status of refill. Pt states he is going out of town on Friday 2/25/22 and needs prescriptions prior to Friday. Please send to [facility] [place] no

## 2022-07-06 ENCOUNTER — TELEPHONE (OUTPATIENT)
Dept: INTERNAL MEDICINE CLINIC | Facility: CLINIC | Age: 62
End: 2022-07-06

## 2022-07-06 RX ORDER — OXYCODONE HYDROCHLORIDE 30 MG/1
1 TABLET, FILM COATED, EXTENDED RELEASE ORAL 2 TIMES DAILY
Qty: 60 TABLET | Refills: 0 | Status: CANCELLED | OUTPATIENT
Start: 2022-07-06

## 2022-07-06 NOTE — TELEPHONE ENCOUNTER
Pt. Called to ask Dr. Aicha Harris. If he would refill his Norco and Oxycontin. They are not due to 7/16/22, but he is currently in [place] and will be home by the 16th and would like to pick them up when he returns.

## 2022-07-07 NOTE — TELEPHONE ENCOUNTER
Please review all three are for controlled substances  Last OV was 9/8/2021  Checked I  and all medications have been dispensed as previously written with no refills available    Oxycodone 20mg written 6/16/2022 #60 no RF  Oxycodone 30mg written 4/18/2022 #60 no RF  Hydrocodone written 6/16/2022 #180 no RF

## 2022-07-12 ENCOUNTER — TELEPHONE (OUTPATIENT)
Dept: INTERNAL MEDICINE CLINIC | Facility: CLINIC | Age: 62
End: 2022-07-12

## 2022-07-12 NOTE — TELEPHONE ENCOUNTER
To Dr. Janeth Cervantes---    The above refill request is for a controlled substance. Please review pended medication order.     Lov: 9/21  Prescribed: 6/16/22   : \"

## 2022-07-12 NOTE — TELEPHONE ENCOUNTER
To MD:  The above refill request is for a controlled substance. Please review pended medication order. Print and sign for staff to fax to pharmacy or prescribe electronically.     Last office visit: 9/8/21  Last time refill sent and quantity/refills: 6/16/22  Per [place]  last dispensed 6/16/22    TO Dr. Bear Gallego-- rx pending with earliest dispense date 7/15/22

## 2022-07-13 RX ORDER — OXYCODONE HYDROCHLORIDE 10 MG/1
10 TABLET, FILM COATED, EXTENDED RELEASE ORAL EVERY 12 HOURS
Qty: 60 TABLET | Refills: 0 | Status: SHIPPED | OUTPATIENT
Start: 2022-07-13

## 2022-07-13 RX ORDER — HYDROCODONE BITARTRATE AND ACETAMINOPHEN 10; 325 MG/1; MG/1
2 TABLET ORAL 3 TIMES DAILY PRN
Qty: 180 TABLET | Refills: 0 | Status: SHIPPED | OUTPATIENT
Start: 2022-07-15

## 2022-07-17 RX ORDER — OXYCODONE HCL 20 MG/1
20 TABLET, FILM COATED, EXTENDED RELEASE ORAL EVERY 12 HOURS
Qty: 60 TABLET | Refills: 0 | OUTPATIENT
Start: 2022-07-17

## 2022-07-17 RX ORDER — OXYCODONE HYDROCHLORIDE 30 MG/1
1 TABLET, FILM COATED, EXTENDED RELEASE ORAL 2 TIMES DAILY
Qty: 60 TABLET | Refills: 0 | OUTPATIENT
Start: 2022-07-17

## 2022-07-17 RX ORDER — HYDROCODONE BITARTRATE AND ACETAMINOPHEN 10; 325 MG/1; MG/1
2 TABLET ORAL 3 TIMES DAILY PRN
Qty: 180 TABLET | Refills: 0 | OUTPATIENT
Start: 2022-07-17

## 2022-08-04 RX ORDER — OXYCODONE HYDROCHLORIDE 10 MG/1
10 TABLET, FILM COATED, EXTENDED RELEASE ORAL EVERY 12 HOURS
Qty: 60 TABLET | Refills: 0 | OUTPATIENT
Start: 2022-08-04

## 2022-08-04 RX ORDER — HYDROCODONE BITARTRATE AND ACETAMINOPHEN 10; 325 MG/1; MG/1
2 TABLET ORAL 3 TIMES DAILY PRN
Qty: 180 TABLET | Refills: 0 | OUTPATIENT
Start: 2022-08-04

## 2022-08-08 ENCOUNTER — TELEPHONE (OUTPATIENT)
Dept: INTERNAL MEDICINE CLINIC | Facility: CLINIC | Age: 62
End: 2022-08-08

## 2022-08-08 RX ORDER — OXYCODONE HYDROCHLORIDE 10 MG/1
TABLET, FILM COATED, EXTENDED RELEASE ORAL
Qty: 60 TABLET | Refills: 0 | OUTPATIENT
Start: 2022-08-08

## 2022-08-08 RX ORDER — HYDROCODONE BITARTRATE AND ACETAMINOPHEN 10; 325 MG/1; MG/1
2 TABLET ORAL 3 TIMES DAILY PRN
Qty: 180 TABLET | Refills: 0 | Status: SHIPPED | OUTPATIENT
Start: 2022-08-13

## 2022-08-08 NOTE — TELEPHONE ENCOUNTER
Imp- chronic pain syndrome    Rec- stop oxycontin    Continue Norco 10 mg two tabs po TID #180; ERx sent    Pt called

## 2022-08-08 NOTE — TELEPHONE ENCOUNTER
To MD:  The above refill request is for a controlled substance. Please review pended medication order. Print and sign for staff to fax to pharmacy or prescribe electronically.     Last office visit: 9/8/2021   Last time refill sent and quantity/refills: 7/13/2022 #60/0

## 2022-08-15 ENCOUNTER — TELEPHONE (OUTPATIENT)
Dept: INTERNAL MEDICINE CLINIC | Facility: CLINIC | Age: 62
End: 2022-08-15

## 2022-08-15 NOTE — TELEPHONE ENCOUNTER
Patient has called back to speak with someone in Dr Chavo Tan office regarding his insurance. Patient was informed per  that Dr Brandy Banuelos is not in network with his current insurance ([place] [facility] [address] [facility] Patient states he was informed per the insurance company that Dr Brandy Banuelos was in network and that he would still be able to see him. Patient has been mislead by insurance. Patient states he is going to call his insurance and change back to his old insurance (plain Medicare) to keep Dr Brandy Banuelos as his physician. At this time, patient is going to keep his appointment with Dr Brandy Banuelos this Friday, 8/19/2022. Patient will confirm with his insurance that the visit will be covered. Patient will then call back to update the office.

## 2022-08-15 NOTE — TELEPHONE ENCOUNTER
Received via fax from [address] [place] [address] request of medical information. Patient has plan that Dr. Jayashree St is not in network (Medicare/[place] [employer] plan) with. Asking if Dr. Jayashree St would do urgent PA request to see patient. Patient has appointment 8/19/2022 with Dr. Jayashree St. Nimesh Khan RN coordinator from [place] public aid states patient has option to change back to straight Medicare, but would not be effective until next month. Paperwork has been placed in Dr. Artem Solorzano.

## 2022-09-06 ENCOUNTER — TELEPHONE (OUTPATIENT)
Dept: INTERNAL MEDICINE CLINIC | Facility: CLINIC | Age: 62
End: 2022-09-06

## 2022-09-06 RX ORDER — HYDROCODONE BITARTRATE AND ACETAMINOPHEN 10; 325 MG/1; MG/1
2 TABLET ORAL 3 TIMES DAILY PRN
Qty: 180 TABLET | Refills: 0 | OUTPATIENT
Start: 2022-09-06

## 2022-09-06 NOTE — TELEPHONE ENCOUNTER
Patient is calling to request a refill on his Norco. Please send refill to the [employer] in [address] [facility], [address] on [address].     FYI patient has a medicare annual scheduiled with Dr Mary Jo Zafar on 9/16/2022

## 2022-09-07 RX ORDER — HYDROCODONE BITARTRATE AND ACETAMINOPHEN 10; 325 MG/1; MG/1
2 TABLET ORAL 3 TIMES DAILY PRN
Qty: 180 TABLET | Refills: 0 | Status: SHIPPED | OUTPATIENT
Start: 2022-09-07

## 2022-09-12 ENCOUNTER — TELEPHONE (OUTPATIENT)
Dept: INTERNAL MEDICINE CLINIC | Facility: CLINIC | Age: 62
End: 2022-09-12

## 2022-09-12 NOTE — TELEPHONE ENCOUNTER
Pt was in the ER at [facility] on Saturday  - hospital ph# [phone]    Pt states hospital called on Sunday     Pt was advised they mis read results     Pt has a mass on his left lung   He was advised to have a PET scan and to see his PCP    Pt is scheduled to see Dr Marcela Watts on Friday/Sept 16 - asks if we can request results for Dr to review  Pt states he still is not feeling well    Call back # [phone]

## 2022-09-12 NOTE — TELEPHONE ENCOUNTER
Spoke to pt, states will be meeting with MD at [address] [place] [address] to obtain copy of images, a hard copy of results, and labs that were drawn on him last Saturday. Will meet with Dr Michael Cm Friday 9/16. Pt understood instructios and agrees with plan.

## 2022-09-16 ENCOUNTER — OFFICE VISIT (OUTPATIENT)
Dept: INTERNAL MEDICINE CLINIC | Facility: CLINIC | Age: 62
End: 2022-09-16
Payer: MEDICARE

## 2022-09-16 ENCOUNTER — LAB ENCOUNTER (OUTPATIENT)
Dept: LAB | Age: 62
End: 2022-09-16
Attending: INTERNAL MEDICINE

## 2022-09-16 VITALS
DIASTOLIC BLOOD PRESSURE: 70 MMHG | HEIGHT: 68 IN | WEIGHT: 215 LBS | SYSTOLIC BLOOD PRESSURE: 138 MMHG | BODY MASS INDEX: 32.58 KG/M2 | TEMPERATURE: 98 F | HEART RATE: 72 BPM

## 2022-09-16 DIAGNOSIS — G25.81 RLS (RESTLESS LEGS SYNDROME): ICD-10-CM

## 2022-09-16 DIAGNOSIS — J44.9 CHRONIC OBSTRUCTIVE PULMONARY DISEASE, UNSPECIFIED COPD TYPE (HCC): ICD-10-CM

## 2022-09-16 DIAGNOSIS — R91.8 LUNG MASS: ICD-10-CM

## 2022-09-16 DIAGNOSIS — Z00.00 PHYSICAL EXAM, ANNUAL: Primary | ICD-10-CM

## 2022-09-16 DIAGNOSIS — Z12.5 SCREENING PSA (PROSTATE SPECIFIC ANTIGEN): ICD-10-CM

## 2022-09-16 DIAGNOSIS — S43.431S LABRAL TEAR OF SHOULDER, RIGHT, SEQUELA: ICD-10-CM

## 2022-09-16 DIAGNOSIS — I71.4 ABDOMINAL AORTIC ANEURYSM (AAA) WITHOUT RUPTURE (HCC): ICD-10-CM

## 2022-09-16 DIAGNOSIS — M19.90 OSTEOARTHRITIS, UNSPECIFIED OSTEOARTHRITIS TYPE, UNSPECIFIED SITE: ICD-10-CM

## 2022-09-16 DIAGNOSIS — E11.9 TYPE 2 DIABETES MELLITUS WITHOUT COMPLICATION, UNSPECIFIED WHETHER LONG TERM INSULIN USE (HCC): ICD-10-CM

## 2022-09-16 DIAGNOSIS — G89.4 CHRONIC PAIN SYNDROME: ICD-10-CM

## 2022-09-16 DIAGNOSIS — G62.9 PERIPHERAL POLYNEUROPATHY: ICD-10-CM

## 2022-09-16 DIAGNOSIS — F41.9 ANXIETY: ICD-10-CM

## 2022-09-16 DIAGNOSIS — K29.70 GASTRITIS WITHOUT BLEEDING, UNSPECIFIED CHRONICITY, UNSPECIFIED GASTRITIS TYPE: ICD-10-CM

## 2022-09-16 DIAGNOSIS — I10 ESSENTIAL HYPERTENSION: ICD-10-CM

## 2022-09-16 DIAGNOSIS — R94.31 ABNORMAL EKG: ICD-10-CM

## 2022-09-16 DIAGNOSIS — F17.200 SMOKING: ICD-10-CM

## 2022-09-16 DIAGNOSIS — L98.9 FACIAL LESION: ICD-10-CM

## 2022-09-16 DIAGNOSIS — R04.2 HEMOPTYSIS: ICD-10-CM

## 2022-09-16 DIAGNOSIS — E78.00 PURE HYPERCHOLESTEROLEMIA: ICD-10-CM

## 2022-09-16 DIAGNOSIS — I73.9 PVD (PERIPHERAL VASCULAR DISEASE) (HCC): ICD-10-CM

## 2022-09-16 DIAGNOSIS — K76.0 HEPATIC STEATOSIS: ICD-10-CM

## 2022-09-16 DIAGNOSIS — K64.8 INTERNAL HEMORRHOIDS: ICD-10-CM

## 2022-09-16 LAB
ALBUMIN SERPL-MCNC: 3.7 G/DL (ref 3.4–5)
ALBUMIN/GLOB SERPL: 0.9 {RATIO} (ref 1–2)
ALP LIVER SERPL-CCNC: 153 U/L
ALT SERPL-CCNC: 35 U/L
AMPHET UR QL SCN: NEGATIVE
ANION GAP SERPL CALC-SCNC: 5 MMOL/L (ref 0–18)
AST SERPL-CCNC: 26 U/L (ref 15–37)
BARBITURATES UR QL SCN: NEGATIVE
BASOPHILS # BLD AUTO: 0.04 X10(3) UL (ref 0–0.2)
BASOPHILS NFR BLD AUTO: 0.5 %
BENZODIAZ UR QL SCN: NEGATIVE
BILIRUB SERPL-MCNC: 0.5 MG/DL (ref 0.1–2)
BILIRUB UR QL: NEGATIVE
BUN BLD-MCNC: 23 MG/DL (ref 7–18)
BUN/CREAT SERPL: 12.4 (ref 10–20)
CALCIUM BLD-MCNC: 9.2 MG/DL (ref 8.5–10.1)
CANNABINOIDS UR QL SCN: NEGATIVE
CHLORIDE SERPL-SCNC: 109 MMOL/L (ref 98–112)
CHOLEST SERPL-MCNC: 130 MG/DL (ref ?–200)
CLARITY UR: CLEAR
CO2 SERPL-SCNC: 26 MMOL/L (ref 21–32)
COCAINE UR QL: NEGATIVE
COLOR UR: YELLOW
COMPLEXED PSA SERPL-MCNC: 1.33 NG/ML (ref ?–4)
CREAT BLD-MCNC: 1.85 MG/DL
CREAT UR-SCNC: 110 MG/DL
DEPRECATED RDW RBC AUTO: 47.4 FL (ref 35.1–46.3)
EOSINOPHIL # BLD AUTO: 0.35 X10(3) UL (ref 0–0.7)
EOSINOPHIL NFR BLD AUTO: 4.4 %
ERYTHROCYTE [DISTWIDTH] IN BLOOD BY AUTOMATED COUNT: 13.5 % (ref 11–15)
EST. AVERAGE GLUCOSE BLD GHB EST-MCNC: 128 MG/DL (ref 68–126)
FASTING PATIENT LIPID ANSWER: NO
FASTING STATUS PATIENT QL REPORTED: NO
GFR SERPLBLD BASED ON 1.73 SQ M-ARVRAT: 40 ML/MIN/1.73M2 (ref 60–?)
GLOBULIN PLAS-MCNC: 4.1 G/DL (ref 2.8–4.4)
GLUCOSE BLD-MCNC: 128 MG/DL (ref 70–99)
GLUCOSE UR-MCNC: NEGATIVE MG/DL
HBA1C MFR BLD: 6.1 % (ref ?–5.7)
HCT VFR BLD AUTO: 45.9 %
HDLC SERPL-MCNC: 26 MG/DL (ref 40–59)
HGB BLD-MCNC: 14.3 G/DL
HGB UR QL STRIP.AUTO: NEGATIVE
IMM GRANULOCYTES # BLD AUTO: 0.02 X10(3) UL (ref 0–1)
IMM GRANULOCYTES NFR BLD: 0.3 %
KETONES UR-MCNC: NEGATIVE MG/DL
LDLC SERPL CALC-MCNC: 73 MG/DL (ref ?–100)
LEUKOCYTE ESTERASE UR QL STRIP.AUTO: NEGATIVE
LYMPHOCYTES # BLD AUTO: 1.95 X10(3) UL (ref 1–4)
LYMPHOCYTES NFR BLD AUTO: 24.4 %
MCH RBC QN AUTO: 29.4 PG (ref 26–34)
MCHC RBC AUTO-ENTMCNC: 31.2 G/DL (ref 31–37)
MCV RBC AUTO: 94.4 FL
MDMA UR QL SCN: NEGATIVE
METHADONE UR QL SCN: NEGATIVE
MONOCYTES # BLD AUTO: 0.8 X10(3) UL (ref 0.1–1)
MONOCYTES NFR BLD AUTO: 10 %
NEUTROPHILS # BLD AUTO: 4.82 X10 (3) UL (ref 1.5–7.7)
NEUTROPHILS # BLD AUTO: 4.82 X10(3) UL (ref 1.5–7.7)
NEUTROPHILS NFR BLD AUTO: 60.4 %
NITRITE UR QL STRIP.AUTO: NEGATIVE
NONHDLC SERPL-MCNC: 104 MG/DL (ref ?–130)
OSMOLALITY SERPL CALC.SUM OF ELEC: 295 MOSM/KG (ref 275–295)
OXYCODONE UR QL SCN: NEGATIVE
PCP UR QL SCN: NEGATIVE
PH UR: 7 [PH] (ref 5–8)
PLATELET # BLD AUTO: 169 10(3)UL (ref 150–450)
POTASSIUM SERPL-SCNC: 4.4 MMOL/L (ref 3.5–5.1)
PROT SERPL-MCNC: 7.8 G/DL (ref 6.4–8.2)
RBC # BLD AUTO: 4.86 X10(6)UL
SODIUM SERPL-SCNC: 140 MMOL/L (ref 136–145)
SP GR UR STRIP: 1.02 (ref 1–1.03)
TRIGL SERPL-MCNC: 182 MG/DL (ref 30–149)
TSI SER-ACNC: 2.38 MIU/ML (ref 0.36–3.74)
UROBILINOGEN UR STRIP-ACNC: 0.2
VLDLC SERPL CALC-MCNC: 28 MG/DL (ref 0–30)
WBC # BLD AUTO: 8 X10(3) UL (ref 4–11)

## 2022-09-16 PROCEDURE — G0439 PPPS, SUBSEQ VISIT: HCPCS | Performed by: INTERNAL MEDICINE

## 2022-09-16 PROCEDURE — 84443 ASSAY THYROID STIM HORMONE: CPT

## 2022-09-16 PROCEDURE — 81001 URINALYSIS AUTO W/SCOPE: CPT

## 2022-09-16 PROCEDURE — 83036 HEMOGLOBIN GLYCOSYLATED A1C: CPT

## 2022-09-16 PROCEDURE — 80361 OPIATES 1 OR MORE: CPT

## 2022-09-16 PROCEDURE — 81015 MICROSCOPIC EXAM OF URINE: CPT

## 2022-09-16 PROCEDURE — 80307 DRUG TEST PRSMV CHEM ANLYZR: CPT

## 2022-09-16 PROCEDURE — 80061 LIPID PANEL: CPT

## 2022-09-16 PROCEDURE — 80053 COMPREHEN METABOLIC PANEL: CPT

## 2022-09-16 PROCEDURE — 85025 COMPLETE CBC W/AUTO DIFF WBC: CPT

## 2022-09-16 PROCEDURE — 99214 OFFICE O/P EST MOD 30 MIN: CPT | Performed by: INTERNAL MEDICINE

## 2022-09-16 PROCEDURE — 36415 COLL VENOUS BLD VENIPUNCTURE: CPT

## 2022-09-16 RX ORDER — OXYCODONE HYDROCHLORIDE 10 MG/1
10 TABLET, FILM COATED, EXTENDED RELEASE ORAL EVERY 12 HOURS
Qty: 60 TABLET | Refills: 0 | Status: SHIPPED | OUTPATIENT
Start: 2022-09-16

## 2022-09-20 LAB
OPIATES, UR, 6-ACETYLMORPHINE: <10 NG/ML
OPIATES, URINE, CODEINE: <20 NG/ML
OPIATES, URINE, HYDROCODONE: 257 NG/ML
OPIATES, URINE, HYDROMORPHONE: 77 NG/ML
OPIATES, URINE, MORPHINE: <20 NG/ML
OPIATES, URINE, NORHYDROCODONE: 269 NG/ML
OPIATES, URINE, NOROXYCODONE: <20 NG/ML
OPIATES, URINE, NOROXYMORPHONE: <20 NG/ML
OPIATES, URINE, OXYCODONE: <20 NG/ML
OPIATES, URINE, OXYMORPHONE: <20 NG/ML

## 2022-09-27 ENCOUNTER — TELEPHONE (OUTPATIENT)
Dept: INTERNAL MEDICINE CLINIC | Facility: CLINIC | Age: 62
End: 2022-09-27

## 2022-09-27 NOTE — TELEPHONE ENCOUNTER
Pt called, states he has lung cancer  Requests to speak with you when you are back in the office [phone]    Message routed to Dr Tami Jolly

## 2022-10-05 ENCOUNTER — TELEPHONE (OUTPATIENT)
Dept: INTERNAL MEDICINE CLINIC | Facility: CLINIC | Age: 62
End: 2022-10-05

## 2022-10-05 RX ORDER — OXYCODONE HYDROCHLORIDE 10 MG/1
10 TABLET, FILM COATED, EXTENDED RELEASE ORAL EVERY 12 HOURS
Qty: 60 TABLET | Refills: 0 | Status: CANCELLED | OUTPATIENT
Start: 2022-10-05

## 2022-10-05 NOTE — TELEPHONE ENCOUNTER
Patient is calling back and states that he just realize that he is completely out of his [place]    Patient is asking for the Norco to be filled early. Send to [facility] [facility].     See message below also

## 2022-10-05 NOTE — TELEPHONE ENCOUNTER
Patient is calling and would like a refill for     Norco    Oxycontin 10MG    Please send to [employer]

## 2022-10-10 RX ORDER — HYDROCODONE BITARTRATE AND ACETAMINOPHEN 10; 325 MG/1; MG/1
2 TABLET ORAL 3 TIMES DAILY PRN
Qty: 180 TABLET | Refills: 0 | Status: SHIPPED | OUTPATIENT
Start: 2022-10-10

## 2022-10-10 NOTE — TELEPHONE ENCOUNTER
To MD:  The above refill request is for a controlled substance. Please review pended medication order. Print and sign for staff to fax to pharmacy or prescribe electronically.     Last office visit: 9/16/22  Last time refill sent and quantity/refills: 9/7/22 #180

## 2022-10-10 NOTE — TELEPHONE ENCOUNTER
Patient calling on status of refill for [place].  Pharmacy closes at 5:00pm.   Patient would like call back when refill has been sent to pharmacy.

## 2022-10-10 NOTE — TELEPHONE ENCOUNTER
Patient is calling to check on the status of [place] refill, patient is out of medication    Please call patient with an update [phone]

## 2022-10-13 ENCOUNTER — TELEPHONE (OUTPATIENT)
Dept: INTERNAL MEDICINE CLINIC | Facility: CLINIC | Age: 62
End: 2022-10-13

## 2022-10-14 RX ORDER — OXYCODONE HYDROCHLORIDE 10 MG/1
TABLET, FILM COATED, EXTENDED RELEASE ORAL
Qty: 60 TABLET | Refills: 0 | Status: SHIPPED | OUTPATIENT
Start: 2022-10-14

## 2022-10-14 NOTE — TELEPHONE ENCOUNTER
To MD:  The above refill request is for a controlled substance. Please review pended medication order. Print and sign for staff to fax to pharmacy or prescribe electronically.     Last office visit: 9/16/22  Last time refill sent and quantity/refills: RX printed 9/16/22 for qty 60    Last dispensed 9/17/22 per [place]

## 2022-10-14 NOTE — TELEPHONE ENCOUNTER
Please call patient with status on refill for:  Oxycontin  Hoping to  today  Pt will be out of medication today  Pharmacy only open until 5:00 today  Tasked to [employer]

## 2022-10-19 RX ORDER — OXYCODONE HYDROCHLORIDE 10 MG/1
10 TABLET, FILM COATED, EXTENDED RELEASE ORAL EVERY 12 HOURS
Qty: 60 TABLET | Refills: 0 | OUTPATIENT
Start: 2022-10-19

## 2022-10-19 RX ORDER — HYDROCODONE BITARTRATE AND ACETAMINOPHEN 10; 325 MG/1; MG/1
2 TABLET ORAL 3 TIMES DAILY PRN
Qty: 180 TABLET | Refills: 0 | OUTPATIENT
Start: 2022-10-19

## 2022-11-17 NOTE — PROCEDURE: MOHS SURGERY
NO PROGRESSION on Imaging. Complex Repair And Flap Additional Text (Will Appearing After The Standard Complex Repair Text): The complex repair was not sufficient to completely close the primary defect. The remaining additional defect was repaired with the flap mentioned below.

## 2022-11-22 ENCOUNTER — TELEPHONE (OUTPATIENT)
Dept: INTERNAL MEDICINE CLINIC | Facility: CLINIC | Age: 62
End: 2022-11-22

## 2022-11-22 RX ORDER — OXYCODONE HCL 10 MG/1
10 TABLET, FILM COATED, EXTENDED RELEASE ORAL EVERY 12 HOURS
Qty: 60 TABLET | Refills: 0 | Status: CANCELLED | OUTPATIENT
Start: 2022-11-22

## 2022-11-22 NOTE — TELEPHONE ENCOUNTER
Pt. Called stating he heard from his pharmacy that the Oxycontin needs a PA now. Pt. Is asking if Dr. Arsh Betts change the medication to something else so he does not have to deal with the PA process. He just paid $176.00 for it and can not afford that every month. Pt. Can be reached at [phone].

## 2022-12-02 ENCOUNTER — TELEPHONE (OUTPATIENT)
Dept: INTERNAL MEDICINE CLINIC | Facility: CLINIC | Age: 62
End: 2022-12-02

## 2022-12-02 RX ORDER — PANTOPRAZOLE SODIUM 40 MG/1
40 TABLET, DELAYED RELEASE ORAL
Qty: 90 TABLET | Refills: 3 | Status: SHIPPED | OUTPATIENT
Start: 2022-12-02

## 2022-12-02 RX ORDER — HYDROCODONE BITARTRATE AND ACETAMINOPHEN 10; 325 MG/1; MG/1
2 TABLET ORAL 3 TIMES DAILY PRN
Qty: 180 TABLET | Refills: 0 | Status: CANCELLED | OUTPATIENT
Start: 2022-12-02

## 2022-12-02 RX ORDER — OXYCODONE HCL 10 MG/1
TABLET, FILM COATED, EXTENDED RELEASE ORAL
Qty: 60 TABLET | Refills: 0 | OUTPATIENT
Start: 2022-12-02

## 2022-12-02 NOTE — TELEPHONE ENCOUNTER
Will forward to Geremias Saldivar to request PA for oxycontin ER 10 mg po BID; Dx - Osteoarthritis lumbar spine (M47.816),  s/p lumbar fusion (Z98.1)  chronic pain syndrome (G89.4)

## 2022-12-05 ENCOUNTER — TELEPHONE (OUTPATIENT)
Dept: INTERNAL MEDICINE CLINIC | Facility: CLINIC | Age: 62
End: 2022-12-05

## 2022-12-05 RX ORDER — HYDROCODONE BITARTRATE AND ACETAMINOPHEN 10; 325 MG/1; MG/1
2 TABLET ORAL 3 TIMES DAILY PRN
Qty: 180 TABLET | Refills: 0 | Status: SHIPPED | OUTPATIENT
Start: 2022-12-05

## 2022-12-05 RX ORDER — OXYCODONE HCL 10 MG/1
10 TABLET, FILM COATED, EXTENDED RELEASE ORAL EVERY 12 HOURS
Qty: 60 TABLET | Refills: 0 | Status: SHIPPED | OUTPATIENT
Start: 2022-12-05

## 2022-12-05 NOTE — TELEPHONE ENCOUNTER
To MD:  The above refill request is for a controlled substance. Please review pended medication order. Print and sign for staff to fax to pharmacy or prescribe electronically.     Last office visit: 9/16/22  Last time refill sent and quantity/refills: 11/9/22 #180   Per [place]  last dispensed 11/9/22    TO Dr. Janeth Cervantes to please advise- patient is a few days early for refill

## 2022-12-22 ENCOUNTER — TELEPHONE (OUTPATIENT)
Dept: INTERNAL MEDICINE CLINIC | Facility: CLINIC | Age: 62
End: 2022-12-22

## 2022-12-22 NOTE — TELEPHONE ENCOUNTER
Wife, Lilia Rondonkner is calling to request a refill on Norco before the holidays.      Ph # [phone]

## 2022-12-23 RX ORDER — HYDROCODONE BITARTRATE AND ACETAMINOPHEN 10; 325 MG/1; MG/1
2 TABLET ORAL 3 TIMES DAILY PRN
Qty: 180 TABLET | Refills: 0 | Status: CANCELLED | OUTPATIENT
Start: 2022-12-23

## 2022-12-23 NOTE — TELEPHONE ENCOUNTER
To MD:  The above refill request is for a controlled substance. Please review pended medication order. Print and sign for staff to fax to pharmacy or prescribe electronically. Last office visit: 9/16/22  Last time refill sent and quantity/refills: 12/5/22 #180    To Dr Aicha Harris to please advise--wife calling to request norco refill \"before the holidays\"  This request is early--please advise  I also note a message from you on on 12/15 (in 12/5 encounter): \"Pt called; advise oncologist Dr Rolan Cody prescribe narcotic going forward, eg MS Contin 15 mg po BID in place of oxycontin ER 10 mg po BID (insurance will not cover)\"  Is this in regards to [address] [place] [address],[address] as well?

## 2023-08-17 NOTE — TELEPHONE ENCOUNTER
Pt and spouse called  Requesting refill  Patient is starting chemo today  He is in pain  Pt uses [facility] [employer]  Pt is out of this medication  Tasked to [employer] yes

## 2023-10-30 NOTE — PROCEDURE: MOHS SURGERY
30-Oct-2023 15:16 Advancement Flap (Single) Text: The defect edges were debeveled with a #15 scalpel blade.  Given the location of the defect and the proximity to free margins a single advancement flap was deemed most appropriate.  Using a sterile surgical marker, an appropriate advancement flap was drawn incorporating the defect and placing the expected incisions within the relaxed skin tension lines where possible.    The area thus outlined was incised deep to adipose tissue with a #15 scalpel blade.  The skin margins were undermined to an appropriate distance in all directions utilizing iris scissors.
